# Patient Record
Sex: MALE | Race: WHITE | NOT HISPANIC OR LATINO | Employment: OTHER | URBAN - NONMETROPOLITAN AREA
[De-identification: names, ages, dates, MRNs, and addresses within clinical notes are randomized per-mention and may not be internally consistent; named-entity substitution may affect disease eponyms.]

---

## 2019-03-19 ENCOUNTER — TELEPHONE (OUTPATIENT)
Dept: SURGERY | Facility: CLINIC | Age: 54
End: 2019-03-19

## 2019-03-19 ENCOUNTER — OFFICE VISIT (OUTPATIENT)
Dept: FAMILY MEDICINE CLINIC | Facility: CLINIC | Age: 54
End: 2019-03-19

## 2019-03-19 VITALS
OXYGEN SATURATION: 96 % | SYSTOLIC BLOOD PRESSURE: 122 MMHG | DIASTOLIC BLOOD PRESSURE: 80 MMHG | RESPIRATION RATE: 14 BRPM | HEIGHT: 70 IN | HEART RATE: 68 BPM | BODY MASS INDEX: 24.2 KG/M2 | WEIGHT: 169 LBS

## 2019-03-19 DIAGNOSIS — Z12.11 SCREEN FOR COLON CANCER: ICD-10-CM

## 2019-03-19 DIAGNOSIS — D48.9 NEOPLASM, UNCERTAIN WHETHER BENIGN OR MALIGNANT: ICD-10-CM

## 2019-03-19 DIAGNOSIS — Z00.00 ROUTINE GENERAL MEDICAL EXAMINATION AT A HEALTH CARE FACILITY: Primary | ICD-10-CM

## 2019-03-19 DIAGNOSIS — D23.9 MULTIPLE DYSPLASTIC NEVI: ICD-10-CM

## 2019-03-19 DIAGNOSIS — Z12.5 SCREENING FOR PROSTATE CANCER: ICD-10-CM

## 2019-03-19 DIAGNOSIS — F41.1 GENERALIZED ANXIETY DISORDER: ICD-10-CM

## 2019-03-19 PROCEDURE — 11104 PUNCH BX SKIN SINGLE LESION: CPT | Performed by: FAMILY MEDICINE

## 2019-03-19 PROCEDURE — 11105 PUNCH BX SKIN EA SEP/ADDL: CPT | Performed by: FAMILY MEDICINE

## 2019-03-19 PROCEDURE — 99386 PREV VISIT NEW AGE 40-64: CPT | Performed by: FAMILY MEDICINE

## 2019-03-19 RX ORDER — LORATADINE 10 MG/1
10 TABLET ORAL DAILY
COMMUNITY
End: 2020-03-23 | Stop reason: SDUPTHER

## 2019-03-19 RX ORDER — ESCITALOPRAM OXALATE 10 MG/1
10 TABLET ORAL DAILY
Qty: 30 TABLET | Refills: 1 | Status: SHIPPED | OUTPATIENT
Start: 2019-03-19 | End: 2019-04-15 | Stop reason: SDUPTHER

## 2019-03-20 ENCOUNTER — RESULTS ENCOUNTER (OUTPATIENT)
Dept: FAMILY MEDICINE CLINIC | Facility: CLINIC | Age: 54
End: 2019-03-20

## 2019-03-20 DIAGNOSIS — Z00.00 ROUTINE GENERAL MEDICAL EXAMINATION AT A HEALTH CARE FACILITY: ICD-10-CM

## 2019-03-20 DIAGNOSIS — Z12.5 SCREENING FOR PROSTATE CANCER: ICD-10-CM

## 2019-03-22 ENCOUNTER — PREP FOR SURGERY (OUTPATIENT)
Dept: OTHER | Facility: HOSPITAL | Age: 54
End: 2019-03-22

## 2019-03-22 DIAGNOSIS — Z12.11 ENCOUNTER FOR SCREENING COLONOSCOPY: Primary | ICD-10-CM

## 2019-03-22 RX ORDER — POLYETHYLENE GLYCOL 3350 17 G/17G
238 POWDER, FOR SOLUTION ORAL ONCE
Qty: 14 PACKET | Refills: 0 | Status: SHIPPED | OUTPATIENT
Start: 2019-03-22 | End: 2019-03-22

## 2019-03-22 RX ORDER — BISACODYL 5 MG/1
5 TABLET, DELAYED RELEASE ORAL DAILY
Qty: 4 TABLET | Refills: 0 | Status: SHIPPED | OUTPATIENT
Start: 2019-03-22 | End: 2019-04-30

## 2019-03-22 NOTE — TELEPHONE ENCOUNTER
[place] at [facility] [place], instructions mailed, prep sent in.  Never had one.

## 2019-03-23 LAB
ALBUMIN SERPL-MCNC: 4.3 G/DL (ref 3.5–5)
ALBUMIN/GLOB SERPL: 1.5 G/DL (ref 1–2)
ALP SERPL-CCNC: 59 U/L (ref 38–126)
ALT SERPL-CCNC: 29 U/L (ref 13–69)
AST SERPL-CCNC: 21 U/L (ref 15–46)
BASOPHILS # BLD AUTO: 0.02 10*3/MM3 (ref 0–0.2)
BASOPHILS NFR BLD AUTO: 0.7 % (ref 0–2.5)
BILIRUB SERPL-MCNC: 0.9 MG/DL (ref 0.2–1.3)
BUN SERPL-MCNC: 16 MG/DL (ref 7–20)
BUN/CREAT SERPL: 14.5 (ref 6.3–21.9)
CALCIUM SERPL-MCNC: 9.9 MG/DL (ref 8.4–10.2)
CHLORIDE SERPL-SCNC: 105 MMOL/L (ref 98–107)
CHOLEST SERPL-MCNC: 175 MG/DL (ref 100–199)
CO2 SERPL-SCNC: 28 MMOL/L (ref 26–30)
CREAT SERPL-MCNC: 1.1 MG/DL (ref 0.6–1.3)
EOSINOPHIL # BLD AUTO: 0.05 10*3/MM3 (ref 0–0.7)
EOSINOPHIL NFR BLD AUTO: 1.7 % (ref 0–7)
ERYTHROCYTE [DISTWIDTH] IN BLOOD BY AUTOMATED COUNT: 12.6 % (ref 11.5–14.5)
GLOBULIN SER CALC-MCNC: 2.8 GM/DL
GLUCOSE SERPL-MCNC: 102 MG/DL (ref 74–98)
HCT VFR BLD AUTO: 48.8 % (ref 42–52)
HDL SERPL-SCNC: 34.2 UMOL/L
HDLC SERPL-MCNC: 52 MG/DL
HGB BLD-MCNC: 16.4 G/DL (ref 14–18)
IMM GRANULOCYTES # BLD AUTO: 0.01 10*3/MM3 (ref 0–0.06)
IMM GRANULOCYTES NFR BLD AUTO: 0.3 % (ref 0–0.6)
LDL SERPL QN: 20.9 NM
LDL SERPL-SCNC: 1219 NMOL/L
LDL SMALL SERPL-SCNC: 515 NMOL/L
LDLC SERPL CALC-MCNC: 106 MG/DL (ref 0–99)
LYMPHOCYTES # BLD AUTO: 1.09 10*3/MM3 (ref 0.6–3.4)
LYMPHOCYTES NFR BLD AUTO: 36 % (ref 10–50)
MCH RBC QN AUTO: 29.8 PG (ref 27–31)
MCHC RBC AUTO-ENTMCNC: 33.6 G/DL (ref 30–37)
MCV RBC AUTO: 88.6 FL (ref 80–94)
MONOCYTES # BLD AUTO: 0.29 10*3/MM3 (ref 0–0.9)
MONOCYTES NFR BLD AUTO: 9.6 % (ref 0–12)
NEUTROPHILS # BLD AUTO: 1.57 10*3/MM3 (ref 2–6.9)
NEUTROPHILS NFR BLD AUTO: 51.7 % (ref 37–80)
NRBC BLD AUTO-RTO: 0 /100 WBC (ref 0–0)
PLATELET # BLD AUTO: 185 10*3/MM3 (ref 130–400)
POTASSIUM SERPL-SCNC: 4.7 MMOL/L (ref 3.5–5.1)
PROT SERPL-MCNC: 7.1 G/DL (ref 6.3–8.2)
PSA SERPL-MCNC: 1.01 NG/ML (ref 0.06–4)
RBC # BLD AUTO: 5.51 10*6/MM3 (ref 4.7–6.1)
SODIUM SERPL-SCNC: 139 MMOL/L (ref 137–145)
TRIGL SERPL-MCNC: 87 MG/DL (ref 0–149)
WBC # BLD AUTO: 3.03 10*3/MM3 (ref 4.8–10.8)

## 2019-04-03 ENCOUNTER — OUTSIDE FACILITY SERVICE (OUTPATIENT)
Dept: SURGERY | Facility: CLINIC | Age: 54
End: 2019-04-03

## 2019-04-03 PROCEDURE — 45385 COLONOSCOPY W/LESION REMOVAL: CPT | Performed by: SURGERY

## 2019-04-15 DIAGNOSIS — F41.1 GENERALIZED ANXIETY DISORDER: ICD-10-CM

## 2019-04-15 RX ORDER — ESCITALOPRAM OXALATE 10 MG/1
10 TABLET ORAL DAILY
Qty: 30 TABLET | Refills: 1 | Status: SHIPPED | OUTPATIENT
Start: 2019-04-15 | End: 2019-04-19 | Stop reason: SDUPTHER

## 2019-04-19 RX ORDER — ESCITALOPRAM OXALATE 10 MG/1
10 TABLET ORAL DAILY
Qty: 30 TABLET | Refills: 1 | Status: SHIPPED | OUTPATIENT
Start: 2019-04-19 | End: 2019-04-30 | Stop reason: SDUPTHER

## 2019-04-30 ENCOUNTER — OFFICE VISIT (OUTPATIENT)
Dept: FAMILY MEDICINE CLINIC | Facility: CLINIC | Age: 54
End: 2019-04-30

## 2019-04-30 VITALS
OXYGEN SATURATION: 96 % | DIASTOLIC BLOOD PRESSURE: 78 MMHG | RESPIRATION RATE: 14 BRPM | WEIGHT: 170 LBS | SYSTOLIC BLOOD PRESSURE: 122 MMHG | BODY MASS INDEX: 24.34 KG/M2 | HEART RATE: 76 BPM | HEIGHT: 70 IN

## 2019-04-30 DIAGNOSIS — K63.5 POLYP OF COLON, UNSPECIFIED PART OF COLON, UNSPECIFIED TYPE: Primary | ICD-10-CM

## 2019-04-30 DIAGNOSIS — F41.1 GENERALIZED ANXIETY DISORDER: ICD-10-CM

## 2019-04-30 DIAGNOSIS — K57.30 DIVERTICULOSIS OF SIGMOID COLON: ICD-10-CM

## 2019-04-30 PROCEDURE — 99213 OFFICE O/P EST LOW 20 MIN: CPT | Performed by: FAMILY MEDICINE

## 2019-04-30 RX ORDER — ESCITALOPRAM OXALATE 10 MG/1
10 TABLET ORAL DAILY
Qty: 90 TABLET | Refills: 3 | Status: SHIPPED | OUTPATIENT
Start: 2019-04-30 | End: 2020-03-23 | Stop reason: SDUPTHER

## 2019-10-29 ENCOUNTER — OFFICE VISIT (OUTPATIENT)
Dept: FAMILY MEDICINE CLINIC | Facility: CLINIC | Age: 54
End: 2019-10-29

## 2019-10-29 VITALS
DIASTOLIC BLOOD PRESSURE: 75 MMHG | HEIGHT: 70 IN | OXYGEN SATURATION: 97 % | TEMPERATURE: 97.7 F | HEART RATE: 69 BPM | BODY MASS INDEX: 25.21 KG/M2 | SYSTOLIC BLOOD PRESSURE: 125 MMHG | WEIGHT: 176.13 LBS

## 2019-10-29 DIAGNOSIS — F41.1 GENERALIZED ANXIETY DISORDER: Primary | ICD-10-CM

## 2019-10-29 DIAGNOSIS — M70.50 PES ANSERINE BURSITIS: ICD-10-CM

## 2019-10-29 PROCEDURE — 99213 OFFICE O/P EST LOW 20 MIN: CPT | Performed by: FAMILY MEDICINE

## 2019-10-29 RX ORDER — SULINDAC 200 MG/1
TABLET ORAL
Qty: 42 TABLET | Refills: 0 | Status: SHIPPED | OUTPATIENT
Start: 2019-10-29 | End: 2020-02-28

## 2019-10-29 NOTE — PROGRESS NOTES
Established Patient        Chief Complaint:   Chief Complaint   Patient presents with   • Follow-up     6 month follow up   • Knee Pain     Complaints of left knee pain. Patient states that this has been an on going issue, but the pain seems to be getting worse.        Carl Moraes is a 54 y.o. male    History of Present Illness:   Here for scheduled six-month follow-up concerning his generalized anxiety disorder.  Patient continues to utilize Lexapro daily, has noticed profound improvement mood stability and overall generalized well-being.  Denies any suicidal or homicidal ideation.  He does continue to utilize daily aspirin therapy additionally.    He complains today of left medial knee discomfort, worsened since most recent home remodeling project of which she has been on his knees and up and down from a squatting position in numerous times.  He denies any blunt no single episodes of trauma.  He denies any fever or chills.  No overlying skin changes.  Pain is worsened with palpation of specified areas to the medial portion of the left knee.  He denies any locking or catching of the joint, able to bear weight without difficulty, but pain is worsened with certain activities and movement of the lower extremity.    Subjective     The following portions of the patient's history were reviewed and updated as appropriate: allergies, current medications, past family history, past medical history, past social history, past surgical history and problem list.    No Known Allergies    Review of Systems  1. Constitutional: Negative for fever. Negative for chills, diaphoresis, fatigue and unexpected weight change.   2. HENT: No dysphagia; no changes to vision/hearing/smell/taste; no epistaxis  3. Eyes: Negative for redness and visual disturbance.   4. Respiratory: negative for shortness of breath. Negative for chest pain . Negative for cough and chest tightness.   5. Cardiovascular: Negative for chest pain and palpitations.  "  6. Gastrointestinal: Negative for abdominal distention, abdominal pain and blood in stool.   7. Endocrine: Negative for cold intolerance and heat intolerance.   8. Genitourinary: Negative for difficulty urinating, dysuria and frequency.   9. Musculoskeletal: As per above.  10. Skin: Negative for color change, rash and wound.   11. Neurological: Negative for syncope, weakness and headaches.   12. Hematological: Negative for adenopathy. Does not bruise/bleed easily.   13. Psychiatric/Behavioral: Negative for confusion. The patient is not nervous/anxious.    Objective     Physical Exam   Vital Signs: /75   Pulse 69   Temp 97.7 °F (36.5 °C)   Ht 177.8 cm (70\")   Wt 79.9 kg (176 lb 2 oz)   SpO2 97%   BMI 25.27 kg/m²     General Appearance: alert, oriented x 3, no acute distress.  Skin: warm and dry.   HEENT: Atraumatic.  pupils round and reactive to light and accommodation, oral mucosa pink and moist.  Nares patent without epistaxis.  External auditory canals are patent tympanic membranes intact.  Neck: supple, no JVD, trachea midline.  No thyromegaly  Lungs: CTA, unlabored breathing effort.  Heart: RRR, normal S1 and S2, no S3, no rub.  Abdomen: soft, non-tender, no palpable bladder, present bowel sounds to auscultation ×4.  No guarding or rigidity.  Extremities: no clubbing, cyanosis or edema.  Good range of motion actively and passively.  Symmetric muscle strength and development.  No leg length discrepancies.  Pain on palpation specifically around the peds anserine bursa of the left knee.  No ligamentous laxity, Peng's test was negative.  No appreciable joint line tenderness.  Emy/Woods test negative.  Symmetric dorsiflexion and plantar flexion of bilateral knees.  Quadriceps mechanism intact.  Lockman's test negative.  Neuro: normal speech and mental status.  Cranial nerves II through XII intact.  No anosmia. DTR 2+; proprioception intact.  No focal motor/sensory deficits.    Assessment and " Plan      Assessment:   Carl was seen today for follow-up and knee pain.    Diagnoses and all orders for this visit:    Generalized anxiety disorder    Pes anserine bursitis  -     sulindac (CLINORIL) 200 MG tablet; 1 po bid with food x 14 days, then 1 po daily with food x 14 days, then STOP.        Plan:  I have recommended formal ice compress therapy to left knee, affected area.  Of also recommended a 2-week twice daily course of sulindac 200 mg, after that can take an additional 2 weeks for once daily dosing.  Should he not improve, or if he worsens, return to the clinic for probable need of injection.    Continue current mood stabilizing medication.    Plan to follow-up in 4 months or so for yearly annual visit.  Continue daily multivitamin therapy.  Discussion Summary:    Discussed plan of care in detail with pt today; pt verb understanding and agrees.  Follow up:  Return in about 4 months (around 2/29/2020) for Recheck.     Patient Instructions   Pes Anserine Bursitis Rehab  Ask your health care provider which exercises are safe for you. Do exercises exactly as told by your health care provider and adjust them as directed. It is normal to feel mild stretching, pulling, tightness, or discomfort as you do these exercises, but you should stop right away if you feel sudden pain or your pain gets worse. Do not begin these exercises until told by your health care provider.  Stretching and range of motion exercises  These exercises warm up your muscles and joints and improve the movement and flexibility of your knee. These exercises also help to relieve pain and stiffness.  Exercise A: Hamstring, doorway    1. Lie on your back in front of a doorway with your left / right leg resting against the wall and your other leg flat on the floor in the doorway. There should be a slight bend in your left / right knee.  2. Straighten your left / right knee. You should feel a stretch behind your knee or thigh. If you do not,  scoot your buttocks closer to the door.  3. Hold this position for __________ seconds.  Repeat __________ times. Complete this stretch __________ times a day.  Exercise B: V-sit (hamstrings and adductors)    1. Sit on the floor with your legs extended in a large “V” shape. Keep your knees straight during this exercise.  2. Keeping your head and back in a straight line, bend at your waist to reach for your left foot (position A). You should feel a stretch in your right inner thigh.  3. Hold for __________ seconds. Then slowly return to the upright position.  4. Keeping your head and back in a straight line, bend at your waist to reach forward (position B). You should feel a stretch behind both of your thighs or knees.  5. Hold for __________ seconds. Then slowly return to the upright position.  6. Keeping your head and back in a straight line, bend at your waist to reach for your right foot (position C). You should feel a stretch in your left inner thigh.  7. Hold for __________ seconds. Then slowly return to the upright position.  Repeat __________ times. Complete this exercise __________ times a day.  Exercise C: Quadriceps, prone    14. Lie on your abdomen on a firm surface, such as a bed or padded floor.  15. Bend your left / right knee and hold your ankle. If you cannot reach your ankle or pant leg, loop a belt around your foot and grab the belt instead.  16. Gently pull your heel toward your buttocks. Your knee should not slide out to the side. You should feel a stretch in the front of your thigh and knee.  17. Hold this position for __________ seconds.  Repeat __________ times. Complete this stretch __________ times a day.  Strengthening exercises  These exercises build strength and endurance in your knee and hip. Endurance is the ability to use your muscles for a long time, even after they get tired.  Exercise D: Terminal knee extension, quadriceps  1. Secure a long loop of rubber exercise band around a  sturdy object like a table leg.  2. Put the band behind your left / right knee. Step back from where the band is secured to put tension on the band.  3. Slowly bend your left / right knee. Keep your left / right foot flat on the floor.  4. Tighten the muscle in the front of your thigh and push back against the band to straighten your knee.  5. Hold this position for __________ seconds.  6. Return to having your left / right knee bent.  Repeat __________ times. Complete this stretch __________ times a day.  Exercise E: Straight leg raises (hip abductors)  1. Lie on your side with your left / right leg in the top position. Your head, shoulder, knee, and hip should line up. You may bend your bottom knee to help you balance.  2. Lift your top leg 4-6 inches (10-15 cm) while keeping your toes pointed straight ahead.  3. Hold this position for __________ seconds.  4. Slowly lower your leg to the starting position.  5. Allow your muscles to relax completely after each repetition.  Repeat __________ times. Complete this exercise __________ times a day.  This information is not intended to replace advice given to you by your health care provider. Make sure you discuss any questions you have with your health care provider.  Document Released: 12/18/2006 Document Revised: 08/22/2017 Document Reviewed: 12/02/2016  [employer]vier Interactive Patient Education © 2019 [employer]  Pes Anserine Bursitis    The pes anserine is an area on the inside of your knee, just below the joint, which is cushioned by a fluid-filled sac (bursa). Pes anserine bursitis is a condition that happens when this bursa gets swollen and irritated. The condition causes knee pain.  What are the causes?  This condition may be caused by:  · Making the same movement over and over.  · A hit to the inside of the leg.  What increases the risk?  This injury is most likely to develop in:  · Runners.  · Athletes who play sports that involve a lot of running and quick  side-to-side movements (cutting).  · Athletes who play contact sports.  · People who swim using an inward angle of the knee, such as with the breaststroke.  · People with tight hamstring muscles.  · Females.  · People who are overweight.  · People with flat feet.  · People who have diabetes or osteoarthritis.  What are the signs or symptoms?  Symptoms of this condition include:  · Knee pain that gets better with rest and worse with activities like climbing stairs, walking, running, or getting in and out of a chair (common).  · Swelling.  · Warmth.  · Tenderness when pressing at the inside of the lower leg, just below the knee.  How is this diagnosed?  This condition may be diagnosed based on:  · Your symptoms.  · Your medical history.  · A physical exam.  · Tests, such as:  ? X-rays.  ? MRI and ultrasound. These tests are done to check for swelling and fluid buildup in the bursa and to look at muscles and tendons.  During your physical exam, your health care provider will press on the tendon attachment to see if you feel pain. He or she may also check your hip and knee motion and strength.  How is this treated?  This condition may be treated by:  · Resting your knee.  · Avoiding activities that cause pain.  · Icing the inside of your knee.  · Raising (elevating) your knee while resting.  · Sleeping with a pillow between your knees. This will cushion your injured knee.  · Taking medicine to reduce pain and swelling.  · Having medicines injected into your knee.  · Doing strengthening and stretching exercises (physical therapy).  If these treatments do not work or if the condition keeps coming back, you may need to have surgery to remove the bursa.  Follow these instructions at home:  Managing pain, stiffness, and swelling    · If directed, apply ice to your knee.  ? Put ice in a plastic bag.  ? Place a towel between your skin and the bag.  ? Leave the ice on for 20 minutes, 2-3 times a day.  · While you are sitting,  elevate your knee.  · While you are lying down, elevate your knee above the level of your heart.  · Take over-the-counter and prescription medicines only as told by your health care provider.  Activity  · Return to your normal activities as told by your health care provider. Ask your health care provider what activities are safe for you.  · Do exercises as told by your health care provider.  General instructions  · Sleep with a pillow between your knees.  · Do not use any tobacco products, such as cigarettes, chewing tobacco, and e-cigarettes. Tobacco can delay healing. If you need help quitting, ask your health care provider.  · Keep all follow-up visits as told by your health care provider. This is important.  How is this prevented?  · Warm up and stretch before being active.  · Cool down and stretch after being active.  · Give your body time to rest between periods of activity.  · Make sure to use equipment that fits you.  · Be safe and responsible while being active to avoid falls.  · Do at least 150 minutes of moderate-intensity exercise each week, such as brisk walking or water aerobics.  · Maintain physical fitness, including:  ? Strength.  ? Flexibility.  ? Cardiovascular fitness.  ? Endurance.  Contact a health care provider if:  · Your symptoms do not improve.  · Your symptoms get worse.  This information is not intended to replace advice given to you by your health care provider. Make sure you discuss any questions you have with your health care provider.  Document Released: 12/18/2006 Document Revised: 02/08/2019 Document Reviewed: 12/02/2016  [employer] Interactive Patient Education © 2019 [employer]        Nicola Perry DO  10/29/19  2:36 PM    Please note that portions of this note may have been completed with a voice recognition program. Efforts were made to edit the dictations, but occasionally words are mistranscribed.

## 2019-10-29 NOTE — PATIENT INSTRUCTIONS
Pes Anserine Bursitis Rehab  Ask your health care provider which exercises are safe for you. Do exercises exactly as told by your health care provider and adjust them as directed. It is normal to feel mild stretching, pulling, tightness, or discomfort as you do these exercises, but you should stop right away if you feel sudden pain or your pain gets worse. Do not begin these exercises until told by your health care provider.  Stretching and range of motion exercises  These exercises warm up your muscles and joints and improve the movement and flexibility of your knee. These exercises also help to relieve pain and stiffness.  Exercise A: Hamstring, doorway    1. Lie on your back in front of a doorway with your left / right leg resting against the wall and your other leg flat on the floor in the doorway. There should be a slight bend in your left / right knee.  2. Straighten your left / right knee. You should feel a stretch behind your knee or thigh. If you do not, scoot your buttocks closer to the door.  3. Hold this position for __________ seconds.  Repeat __________ times. Complete this stretch __________ times a day.  Exercise B: V-sit (hamstrings and adductors)    1. Sit on the floor with your legs extended in a large “V” shape. Keep your knees straight during this exercise.  2. Keeping your head and back in a straight line, bend at your waist to reach for your left foot (position A). You should feel a stretch in your right inner thigh.  3. Hold for __________ seconds. Then slowly return to the upright position.  4. Keeping your head and back in a straight line, bend at your waist to reach forward (position B). You should feel a stretch behind both of your thighs or knees.  5. Hold for __________ seconds. Then slowly return to the upright position.  6. Keeping your head and back in a straight line, bend at your waist to reach for your right foot (position C). You should feel a stretch in your left inner  thigh.  7. Hold for __________ seconds. Then slowly return to the upright position.  Repeat __________ times. Complete this exercise __________ times a day.  Exercise C: Quadriceps, prone    1. Lie on your abdomen on a firm surface, such as a bed or padded floor.  2. Bend your left / right knee and hold your ankle. If you cannot reach your ankle or pant leg, loop a belt around your foot and grab the belt instead.  3. Gently pull your heel toward your buttocks. Your knee should not slide out to the side. You should feel a stretch in the front of your thigh and knee.  4. Hold this position for __________ seconds.  Repeat __________ times. Complete this stretch __________ times a day.  Strengthening exercises  These exercises build strength and endurance in your knee and hip. Endurance is the ability to use your muscles for a long time, even after they get tired.  Exercise D: Terminal knee extension, quadriceps  1. Secure a long loop of rubber exercise band around a sturdy object like a table leg.  2. Put the band behind your left / right knee. Step back from where the band is secured to put tension on the band.  3. Slowly bend your left / right knee. Keep your left / right foot flat on the floor.  4. Tighten the muscle in the front of your thigh and push back against the band to straighten your knee.  5. Hold this position for __________ seconds.  6. Return to having your left / right knee bent.  Repeat __________ times. Complete this stretch __________ times a day.  Exercise E: Straight leg raises (hip abductors)  1. Lie on your side with your left / right leg in the top position. Your head, shoulder, knee, and hip should line up. You may bend your bottom knee to help you balance.  2. Lift your top leg 4-6 inches (10-15 cm) while keeping your toes pointed straight ahead.  3. Hold this position for __________ seconds.  4. Slowly lower your leg to the starting position.  5. Allow your muscles to relax completely after  each repetition.  Repeat __________ times. Complete this exercise __________ times a day.  This information is not intended to replace advice given to you by your health care provider. Make sure you discuss any questions you have with your health care provider.  Document Released: 12/18/2006 Document Revised: 08/22/2017 Document Reviewed: 12/02/2016  [employer] Interactive Patient Education © 2019 [employer]  Pes Anserine Bursitis    The pes anserine is an area on the inside of your knee, just below the joint, which is cushioned by a fluid-filled sac (bursa). Pes anserine bursitis is a condition that happens when this bursa gets swollen and irritated. The condition causes knee pain.  What are the causes?  This condition may be caused by:  · Making the same movement over and over.  · A hit to the inside of the leg.  What increases the risk?  This injury is most likely to develop in:  · Runners.  · Athletes who play sports that involve a lot of running and quick side-to-side movements (cutting).  · Athletes who play contact sports.  · People who swim using an inward angle of the knee, such as with the breaststroke.  · People with tight hamstring muscles.  · Females.  · People who are overweight.  · People with flat feet.  · People who have diabetes or osteoarthritis.  What are the signs or symptoms?  Symptoms of this condition include:  · Knee pain that gets better with rest and worse with activities like climbing stairs, walking, running, or getting in and out of a chair (common).  · Swelling.  · Warmth.  · Tenderness when pressing at the inside of the lower leg, just below the knee.  How is this diagnosed?  This condition may be diagnosed based on:  · Your symptoms.  · Your medical history.  · A physical exam.  · Tests, such as:  ? X-rays.  ? MRI and ultrasound. These tests are done to check for swelling and fluid buildup in the bursa and to look at muscles and tendons.  During your physical exam, your health care  provider will press on the tendon attachment to see if you feel pain. He or she may also check your hip and knee motion and strength.  How is this treated?  This condition may be treated by:  · Resting your knee.  · Avoiding activities that cause pain.  · Icing the inside of your knee.  · Raising (elevating) your knee while resting.  · Sleeping with a pillow between your knees. This will cushion your injured knee.  · Taking medicine to reduce pain and swelling.  · Having medicines injected into your knee.  · Doing strengthening and stretching exercises (physical therapy).  If these treatments do not work or if the condition keeps coming back, you may need to have surgery to remove the bursa.  Follow these instructions at home:  Managing pain, stiffness, and swelling    · If directed, apply ice to your knee.  ? Put ice in a plastic bag.  ? Place a towel between your skin and the bag.  ? Leave the ice on for 20 minutes, 2-3 times a day.  · While you are sitting, elevate your knee.  · While you are lying down, elevate your knee above the level of your heart.  · Take over-the-counter and prescription medicines only as told by your health care provider.  Activity  · Return to your normal activities as told by your health care provider. Ask your health care provider what activities are safe for you.  · Do exercises as told by your health care provider.  General instructions  · Sleep with a pillow between your knees.  · Do not use any tobacco products, such as cigarettes, chewing tobacco, and e-cigarettes. Tobacco can delay healing. If you need help quitting, ask your health care provider.  · Keep all follow-up visits as told by your health care provider. This is important.  How is this prevented?  · Warm up and stretch before being active.  · Cool down and stretch after being active.  · Give your body time to rest between periods of activity.  · Make sure to use equipment that fits you.  · Be safe and responsible while  being active to avoid falls.  · Do at least 150 minutes of moderate-intensity exercise each week, such as brisk walking or water aerobics.  · Maintain physical fitness, including:  ? Strength.  ? Flexibility.  ? Cardiovascular fitness.  ? Endurance.  Contact a health care provider if:  · Your symptoms do not improve.  · Your symptoms get worse.  This information is not intended to replace advice given to you by your health care provider. Make sure you discuss any questions you have with your health care provider.  Document Released: 12/18/2006 Document Revised: 02/08/2019 Document Reviewed: 12/02/2016  [employer] Interactive Patient Education © 2019 [employer]

## 2020-02-28 ENCOUNTER — OFFICE VISIT (OUTPATIENT)
Dept: FAMILY MEDICINE CLINIC | Facility: CLINIC | Age: 55
End: 2020-02-28

## 2020-02-28 VITALS
BODY MASS INDEX: 25.05 KG/M2 | HEIGHT: 70 IN | SYSTOLIC BLOOD PRESSURE: 110 MMHG | DIASTOLIC BLOOD PRESSURE: 70 MMHG | HEART RATE: 88 BPM | WEIGHT: 175 LBS | OXYGEN SATURATION: 97 % | TEMPERATURE: 98.2 F

## 2020-02-28 DIAGNOSIS — M25.562 ARTHRALGIA OF BOTH KNEES: Primary | ICD-10-CM

## 2020-02-28 DIAGNOSIS — F41.1 GENERALIZED ANXIETY DISORDER: ICD-10-CM

## 2020-02-28 DIAGNOSIS — M25.561 ARTHRALGIA OF BOTH KNEES: Primary | ICD-10-CM

## 2020-02-28 PROCEDURE — 99213 OFFICE O/P EST LOW 20 MIN: CPT | Performed by: FAMILY MEDICINE

## 2020-02-28 RX ORDER — MELOXICAM 7.5 MG/1
7.5 TABLET ORAL DAILY
Qty: 90 TABLET | Refills: 3 | Status: SHIPPED | OUTPATIENT
Start: 2020-02-28 | End: 2020-03-23 | Stop reason: SDUPTHER

## 2020-03-17 DIAGNOSIS — Z11.59 ENCOUNTER FOR HEPATITIS C SCREENING TEST FOR LOW RISK PATIENT: ICD-10-CM

## 2020-03-17 DIAGNOSIS — E78.5 DYSLIPIDEMIA: Primary | ICD-10-CM

## 2020-03-17 DIAGNOSIS — Z00.00 ROUTINE GENERAL MEDICAL EXAMINATION AT A HEALTH CARE FACILITY: ICD-10-CM

## 2020-03-18 LAB
ALBUMIN SERPL-MCNC: 4.2 G/DL (ref 3.5–5.2)
ALBUMIN/GLOB SERPL: 1.7 G/DL
ALP SERPL-CCNC: 63 U/L (ref 39–117)
ALT SERPL-CCNC: 22 U/L (ref 1–41)
AST SERPL-CCNC: 15 U/L (ref 1–40)
BASOPHILS # BLD AUTO: 0.04 10*3/MM3 (ref 0–0.2)
BASOPHILS NFR BLD AUTO: 1.4 % (ref 0–1.5)
BILIRUB SERPL-MCNC: 0.5 MG/DL (ref 0.2–1.2)
BUN SERPL-MCNC: 14 MG/DL (ref 6–20)
BUN/CREAT SERPL: 12.8 (ref 7–25)
CALCIUM SERPL-MCNC: 9 MG/DL (ref 8.6–10.5)
CHLORIDE SERPL-SCNC: 105 MMOL/L (ref 98–107)
CHOLEST SERPL-MCNC: 190 MG/DL (ref 0–200)
CO2 SERPL-SCNC: 27.2 MMOL/L (ref 22–29)
CREAT SERPL-MCNC: 1.09 MG/DL (ref 0.76–1.27)
EOSINOPHIL # BLD AUTO: 0.07 10*3/MM3 (ref 0–0.4)
EOSINOPHIL NFR BLD AUTO: 2.4 % (ref 0.3–6.2)
ERYTHROCYTE [DISTWIDTH] IN BLOOD BY AUTOMATED COUNT: 13.1 % (ref 12.3–15.4)
GLOBULIN SER CALC-MCNC: 2.5 GM/DL
GLUCOSE SERPL-MCNC: 102 MG/DL (ref 65–99)
HCT VFR BLD AUTO: 45.7 % (ref 37.5–51)
HCV AB S/CO SERPL IA: <0.1 S/CO RATIO (ref 0–0.9)
HDLC SERPL-MCNC: 50 MG/DL (ref 40–60)
HGB BLD-MCNC: 15.8 G/DL (ref 13–17.7)
IMM GRANULOCYTES # BLD AUTO: 0.02 10*3/MM3 (ref 0–0.05)
IMM GRANULOCYTES NFR BLD AUTO: 0.7 % (ref 0–0.5)
LDLC SERPL CALC-MCNC: 119 MG/DL (ref 0–100)
LYMPHOCYTES # BLD AUTO: 1.13 10*3/MM3 (ref 0.7–3.1)
LYMPHOCYTES NFR BLD AUTO: 39.2 % (ref 19.6–45.3)
MCH RBC QN AUTO: 30 PG (ref 26.6–33)
MCHC RBC AUTO-ENTMCNC: 34.6 G/DL (ref 31.5–35.7)
MCV RBC AUTO: 86.9 FL (ref 79–97)
MONOCYTES # BLD AUTO: 0.22 10*3/MM3 (ref 0.1–0.9)
MONOCYTES NFR BLD AUTO: 7.6 % (ref 5–12)
NEUTROPHILS # BLD AUTO: 1.4 10*3/MM3 (ref 1.7–7)
NEUTROPHILS NFR BLD AUTO: 48.7 % (ref 42.7–76)
NRBC BLD AUTO-RTO: 0 /100 WBC (ref 0–0.2)
PLATELET # BLD AUTO: 177 10*3/MM3 (ref 140–450)
POTASSIUM SERPL-SCNC: 4.8 MMOL/L (ref 3.5–5.2)
PROT SERPL-MCNC: 6.7 G/DL (ref 6–8.5)
RBC # BLD AUTO: 5.26 10*6/MM3 (ref 4.14–5.8)
SODIUM SERPL-SCNC: 141 MMOL/L (ref 136–145)
TRIGL SERPL-MCNC: 103 MG/DL (ref 0–150)
VLDLC SERPL CALC-MCNC: 20.6 MG/DL
WBC # BLD AUTO: 2.88 10*3/MM3 (ref 3.4–10.8)

## 2020-03-23 ENCOUNTER — OFFICE VISIT (OUTPATIENT)
Dept: FAMILY MEDICINE CLINIC | Facility: CLINIC | Age: 55
End: 2020-03-23

## 2020-03-23 VITALS
BODY MASS INDEX: 25.34 KG/M2 | HEART RATE: 60 BPM | WEIGHT: 177 LBS | HEIGHT: 70 IN | OXYGEN SATURATION: 99 % | TEMPERATURE: 97.7 F | SYSTOLIC BLOOD PRESSURE: 120 MMHG | DIASTOLIC BLOOD PRESSURE: 80 MMHG

## 2020-03-23 DIAGNOSIS — F41.1 GENERALIZED ANXIETY DISORDER: ICD-10-CM

## 2020-03-23 DIAGNOSIS — M25.561 ARTHRALGIA OF BOTH KNEES: ICD-10-CM

## 2020-03-23 DIAGNOSIS — M25.562 ARTHRALGIA OF BOTH KNEES: ICD-10-CM

## 2020-03-23 DIAGNOSIS — Z00.00 ROUTINE GENERAL MEDICAL EXAMINATION AT A HEALTH CARE FACILITY: Primary | ICD-10-CM

## 2020-03-23 PROCEDURE — 99396 PREV VISIT EST AGE 40-64: CPT | Performed by: FAMILY MEDICINE

## 2020-03-23 RX ORDER — LORATADINE 10 MG/1
10 TABLET ORAL DAILY
Qty: 90 TABLET | Refills: 3 | Status: SHIPPED | OUTPATIENT
Start: 2020-03-23 | End: 2020-03-23 | Stop reason: SDUPTHER

## 2020-03-23 RX ORDER — MELOXICAM 7.5 MG/1
7.5 TABLET ORAL DAILY
Qty: 90 TABLET | Refills: 3 | Status: SHIPPED | OUTPATIENT
Start: 2020-03-23 | End: 2020-03-23 | Stop reason: SDUPTHER

## 2020-03-23 RX ORDER — LORATADINE 10 MG/1
10 TABLET ORAL DAILY
Qty: 90 TABLET | Refills: 3 | Status: SHIPPED | OUTPATIENT
Start: 2020-03-23 | End: 2021-02-19 | Stop reason: SDUPTHER

## 2020-03-23 RX ORDER — ESCITALOPRAM OXALATE 10 MG/1
10 TABLET ORAL DAILY
Qty: 90 TABLET | Refills: 3 | Status: SHIPPED | OUTPATIENT
Start: 2020-03-23 | End: 2020-03-23 | Stop reason: SDUPTHER

## 2020-03-23 RX ORDER — MELOXICAM 7.5 MG/1
7.5 TABLET ORAL DAILY
Qty: 90 TABLET | Refills: 3 | Status: SHIPPED | OUTPATIENT
Start: 2020-03-23 | End: 2021-02-19 | Stop reason: SDUPTHER

## 2020-03-23 RX ORDER — ESCITALOPRAM OXALATE 10 MG/1
10 TABLET ORAL DAILY
Qty: 90 TABLET | Refills: 3 | Status: SHIPPED | OUTPATIENT
Start: 2020-03-23 | End: 2021-02-19 | Stop reason: SDUPTHER

## 2020-03-23 NOTE — PROGRESS NOTES
Established Patient        Chief Complaint:   Chief Complaint   Patient presents with   • Annual Exam     here for physical        Carl Dimitry is a 55 y.o. male    History of Present Illness:   Here for scheduled annual preventative healthcare exam.  He denies any new problems today.  Currently tolerating all of his medications without difficulty, does need refills however.    He denies any chest pain or shortness of breath.  No reports of dyspnea on exertion, palpitations, vertigo or syncope.  Denies aspiration or dysphagia.  Tolerating a very balanced diet.  No reports of orthopnea, hemoptysis or cough.  Denies dysuria or hematuria.  No reports of bright red blood or black or tarry stools.    Continues to have significant improvement to his arthralgias of osteoarthritic nature, tolerating meloxicam daily.    Subjective     The following portions of the patient's history were reviewed and updated as appropriate: allergies, current medications, past family history, past medical history, past social history, past surgical history and problem list.    No Known Allergies    Review of Systems  1. Constitutional: Negative for fever. Negative for chills, diaphoresis, fatigue and unexpected weight change.   2. HENT: No dysphagia; no changes to vision/hearing/smell/taste; no epistaxis  3. Eyes: Negative for redness and visual disturbance.   4. Respiratory: negative for shortness of breath. Negative for chest pain . Negative for cough and chest tightness.   5. Cardiovascular: Negative for chest pain and palpitations.   6. Gastrointestinal: Negative for abdominal distention, abdominal pain and blood in stool.   7. Endocrine: Negative for cold intolerance and heat intolerance.   8. Genitourinary: Negative for difficulty urinating, dysuria and frequency.   9. Musculoskeletal: Negative for arthralgias, back pain and myalgias.   10. Skin: Negative for color change, rash and wound.   11. Neurological: Negative for syncope,  "weakness and headaches.   12. Hematological: Negative for adenopathy. Does not bruise/bleed easily.   13. Psychiatric/Behavioral: Negative for confusion. The patient is not nervous/anxious.    Objective     Physical Exam   Vital Signs: /80   Pulse 60   Temp 97.7 °F (36.5 °C)   Ht 177.8 cm (70\")   Wt 80.3 kg (177 lb)   SpO2 99%   BMI 25.40 kg/m²     General Appearance: alert, oriented x 3, no acute distress.  Skin: warm and dry.   HEENT: Atraumatic.  pupils round and reactive to light and accommodation, oral mucosa pink and moist.  Nares patent without epistaxis.  External auditory canals are patent tympanic membranes intact.  Neck: supple, no JVD, trachea midline.  No thyromegaly  Lungs: CTA, unlabored breathing effort.  Heart: RRR, normal S1 and S2, no S3, no rub.  Abdomen: soft, non-tender, no palpable bladder, present bowel sounds to auscultation ×4.  No guarding or rigidity.  Extremities: no clubbing, cyanosis or edema.  Good range of motion actively and passively.  Symmetric muscle strength and development  Neuro: normal speech and mental status.  Cranial nerves II through XII intact.  No anosmia. DTR 2+; proprioception intact.  No focal motor/sensory deficits.    Assessment and Plan      Assessment:   Carl was seen today for annual exam.    Diagnoses and all orders for this visit:    Routine general medical examination at a health care facility    Generalized anxiety disorder  -     Discontinue: escitalopram (Lexapro) 10 MG tablet; Take 1 tablet by mouth Daily.    Arthralgia of both knees  -     Discontinue: meloxicam (Mobic) 7.5 MG tablet; Take 1 tablet by mouth Daily.    Other orders  -     Discontinue: loratadine (CLARITIN) 10 MG tablet; Take 1 tablet by mouth Daily.        Plan:  Will RTC in approx 3 months, to have excision of nevus to right N/S fold and chin.    Discussed most recent labs with pt today.    Refills for needed meds today.    Continue daily NSAID therapy.    Continue current " mood stabilizer, no dose adjustment needed at this time.    Discussion Summary:  I have discussed age/gender specific preventative healthcare issues in detail with patient today.  I have answered all of the questions.    Discussed plan of care in detail with pt today; pt verb understanding and agrees.  Follow up:  Return in 3 months (on 6/23/2020).     Patient Instructions   Preventive Care 40-64 Years, Male  Preventive care refers to lifestyle choices and visits with your health care provider that can promote health and wellness.  What does preventive care include?    · A yearly physical exam. This is also called an annual well check.  · Dental exams once or twice a year.  · Routine eye exams. Ask your health care provider how often you should have your eyes checked.  · Personal lifestyle choices, including:  ? Daily care of your teeth and gums.  ? Regular physical activity.  ? Eating a healthy diet.  ? Avoiding tobacco and drug use.  ? Limiting alcohol use.  ? Practicing safe sex.  ? Taking low-dose aspirin every day starting at age 50.  What happens during an annual well check?  The services and screenings done by your health care provider during your annual well check will depend on your age, overall health, lifestyle risk factors, and family history of disease.  Counseling  Your health care provider may ask you questions about your:  · Alcohol use.  · Tobacco use.  · Drug use.  · Emotional well-being.  · Home and relationship well-being.  · Sexual activity.  · Eating habits.  · Work and work environment.  Screening  You may have the following tests or measurements:  · Height, weight, and BMI.  · Blood pressure.  · Lipid and cholesterol levels. These may be checked every 5 years, or more frequently if you are over 50 years old.  · Skin check.  · Lung cancer screening. You may have this screening every year starting at age 55 if you have a 30-pack-year history of smoking and currently smoke or have quit within  the past 15 years.  · Colorectal cancer screening. All adults should have this screening starting at age 50 and continuing until age 75. Your health care provider may recommend screening at age 45. You will have tests every 1-10 years, depending on your results and the type of screening test. People at increased risk should start screening at an earlier age. Screening tests may include:  ? Guaiac-based fecal occult blood testing.  ? Fecal immunochemical test (FIT).  ? Stool DNA test.  ? Virtual colonoscopy.  ? Sigmoidoscopy. During this test, a flexible tube with a tiny camera (sigmoidoscope) is used to examine your rectum and lower colon. The sigmoidoscope is inserted through your anus into your rectum and lower colon.  ? Colonoscopy. During this test, a long, thin, flexible tube with a tiny camera (colonoscope) is used to examine your entire colon and rectum.  · Prostate cancer screening. Recommendations will vary depending on your family history and other risks.  · Hepatitis C blood test.  · Hepatitis B blood test.  · Sexually transmitted disease (STD) testing.  · Diabetes screening. This is done by checking your blood sugar (glucose) after you have not eaten for a while (fasting). You may have this done every 1-3 years.  Discuss your test results, treatment options, and if necessary, the need for more tests with your health care provider.  Vaccines  Your health care provider may recommend certain vaccines, such as:  · Influenza vaccine. This is recommended every year.  · Tetanus, diphtheria, and acellular pertussis (Tdap, Td) vaccine. You may need a Td booster every 10 years.  · Varicella vaccine. You may need this if you have not been vaccinated.  · Zoster vaccine. You may need this after age 60.  · Measles, mumps, and rubella (MMR) vaccine. You may need at least one dose of MMR if you were born in 1957 or later. You may also need a second dose.  · Pneumococcal 13-valent conjugate (PCV13) vaccine. You may need  this if you have certain conditions and have not been vaccinated.  · Pneumococcal polysaccharide (PPSV23) vaccine. You may need one or two doses if you smoke cigarettes or if you have certain conditions.  · Meningococcal vaccine. You may need this if you have certain conditions.  · Hepatitis A vaccine. You may need this if you have certain conditions or if you travel or work in places where you may be exposed to hepatitis A.  · Hepatitis B vaccine. You may need this if you have certain conditions or if you travel or work in places where you may be exposed to hepatitis B.  · Haemophilus influenzae type b (Hib) vaccine. You may need this if you have certain risk factors.  Talk to your health care provider about which screenings and vaccines you need and how often you need them.  This information is not intended to replace advice given to you by your health care provider. Make sure you discuss any questions you have with your health care provider.  Document Released: 01/13/2017 Document Revised: 02/07/2019 Document Reviewed: 10/18/2016  [employer] Patient Education © 2019 [employer]        Nicola Perry DO  03/23/20  13:21          Please note that portions of this note may have been completed with a voice recognition program. Efforts were made to edit the dictations, but occasionally words are mistranscribed.

## 2020-03-23 NOTE — TELEPHONE ENCOUNTER
meloxicam (Mobic) 7.5 MG tablet   loratadine (CLARITIN) 10 MG tablet     escitalopram (Lexapro) 10 MG tablet  aspirin 81 MG tablet      [facility]  [phone]

## 2020-06-23 ENCOUNTER — OFFICE VISIT (OUTPATIENT)
Dept: FAMILY MEDICINE CLINIC | Facility: CLINIC | Age: 55
End: 2020-06-23

## 2020-06-23 VITALS
HEIGHT: 70 IN | SYSTOLIC BLOOD PRESSURE: 130 MMHG | TEMPERATURE: 97.7 F | BODY MASS INDEX: 25.48 KG/M2 | DIASTOLIC BLOOD PRESSURE: 88 MMHG | WEIGHT: 178 LBS

## 2020-06-23 DIAGNOSIS — L91.8 INFLAMED SKIN TAG: Primary | ICD-10-CM

## 2020-06-23 PROCEDURE — 99213 OFFICE O/P EST LOW 20 MIN: CPT | Performed by: FAMILY MEDICINE

## 2020-07-15 NOTE — PROGRESS NOTES
Established Patient        Chief Complaint:   Chief Complaint   Patient presents with   • Skin Lesion        Carl Moraes is a 55 y.o. male    History of Present Illness:   Presents today with complaints of inflamed skin tag/nevus to the right nasolabial fold, present for numerous years.  It affects his ability to groom daily, often times friction as a result of shaving.  It often times bleeding is quite painful.  He is interested in definitive excisional treatment.  Denies any problems with scarring or any fever, chills or night sweats at this time.    Subjective     The following portions of the patient's history were reviewed and updated as appropriate: allergies, current medications, past family history, past medical history, past social history, past surgical history and problem list.    No Known Allergies    Review of Systems  1. Constitutional: Negative for fever. Negative for chills, diaphoresis, fatigue and unexpected weight change.   2. HENT: No dysphagia; no changes to vision/hearing/smell/taste; no epistaxis  3. Eyes: Negative for redness and visual disturbance.   4. Respiratory: negative for shortness of breath. Negative for chest pain . Negative for cough and chest tightness.   5. Cardiovascular: Negative for chest pain and palpitations.   6. Gastrointestinal: Negative for abdominal distention, abdominal pain and blood in stool.   7. Endocrine: Negative for cold intolerance and heat intolerance.   8. Genitourinary: Negative for difficulty urinating, dysuria and frequency.   9. Musculoskeletal: Negative for arthralgias, back pain and myalgias.   10. Skin: Nevus/skin tag to the right nasolabial fold.  11. Neurological: Negative for syncope, weakness and headaches.   12. Hematological: Negative for adenopathy. Does not bruise/bleed easily.   13. Psychiatric/Behavioral: Negative for confusion. The patient is not nervous/anxious.    Objective     Physical Exam   Vital Signs: /88   Temp 97.7 °F  "(36.5 °C)   Ht 177.8 cm (70\")   Wt 80.7 kg (178 lb)   BMI 25.54 kg/m²     General Appearance: alert, oriented x 3, no acute distress.  Skin: warm and dry.  Noted nonpigmented nevus versus complex skin tag to the right nasolabial fold, approximately 5 mm in diameter, surrounded by local erythema from friction.  HEENT: Atraumatic.  pupils round and reactive to light and accommodation, oral mucosa pink and moist.  Nares patent without epistaxis.  External auditory canals are patent tympanic membranes intact.  Neck: supple, no JVD, trachea midline.  No thyromegaly  Lungs: CTA, unlabored breathing effort.  Heart: RRR, normal S1 and S2, no S3, no rub.  Abdomen: soft, non-tender, no palpable bladder, present bowel sounds to auscultation ×4.  No guarding or rigidity.  Extremities: no clubbing, cyanosis or edema.  Good range of motion actively and passively.  Symmetric muscle strength and development  Neuro: normal speech and mental status.  Cranial nerves II through XII intact.  No anosmia. DTR 2+; proprioception intact.  No focal motor/sensory deficits.    Form consent obtained, risks versus benefits discussed in detail.  Patient verbalized understanding and signed consent.  Area was infiltrated with 1% lidocaine without epinephrine, approximately 0.3 mL.  Forceps and #10 scalpel blade were used for tangential excision.  Hemostasis achieved with direct pressure followed by silver nitrate.  Bandage applied to affected area.  Wound care discussed in detail with patient.    Assessment and Plan      Assessment:   Carl was seen today for skin lesion.    Diagnoses and all orders for this visit:    Inflamed skin tag        Plan:  Patient tolerated excision, tangential in nature, to inflamed/irritated nevus of right nasolabial fold.  Silver nitrate was used for cauterization and hemostasis.  Wound care discussed in detail with patient.  She develop any ill effects to the treated area history of [facility] " immediately.  Discussion Summary:    Discussed plan of care in detail with pt today; pt verb understanding and agrees.  Follow up:  No follow-ups on file.     There are no Patient Instructions on file for this visit.    Nicola Perry DO  06/23/20  17:30          Please note that portions of this note may have been completed with a voice recognition program. Efforts were made to edit the dictations, but occasionally words are mistranscribed.

## 2020-11-02 ENCOUNTER — HOSPITAL ENCOUNTER (OUTPATIENT)
Facility: HOSPITAL | Age: 55
Discharge: HOME | End: 2020-11-03
Attending: FAMILY MEDICINE | Admitting: UROLOGY

## 2020-11-02 ENCOUNTER — HOSPITAL ENCOUNTER (OUTPATIENT)
Dept: CT IMAGING | Facility: HOSPITAL | Age: 55
Discharge: HOME | End: 2020-11-02
Admitting: FAMILY MEDICINE

## 2020-11-02 DIAGNOSIS — N13.2 URETERAL STONE WITH HYDRONEPHROSIS: Primary | ICD-10-CM

## 2020-11-02 PROBLEM — N13.5 OBSTRUCTION OF RIGHT URETER: Status: ACTIVE | Noted: 2020-11-02

## 2020-11-02 PROBLEM — N13.30 HYDRONEPHROSIS, RIGHT: Status: ACTIVE | Noted: 2020-11-02

## 2020-11-02 PROBLEM — R10.9 ACUTE ABDOMINAL PAIN IN RIGHT FLANK: Status: ACTIVE | Noted: 2020-11-02

## 2020-11-02 LAB
ALBUMIN SERPL-MCNC: 4 G/DL (ref 3.5–5.2)
ALBUMIN/GLOB SERPL: 1.6 G/DL
ALP SERPL-CCNC: 67 U/L (ref 39–117)
ALT SERPL W P-5'-P-CCNC: 21 U/L (ref 1–41)
ANION GAP SERPL CALCULATED.3IONS-SCNC: 10 MMOL/L (ref 5–15)
AST SERPL-CCNC: 17 U/L (ref 1–40)
BACTERIA UR QL AUTO: ABNORMAL /HPF
BASOPHILS # BLD AUTO: 0.03 10*3/MM3 (ref 0–0.2)
BASOPHILS NFR BLD AUTO: 0.5 % (ref 0–1.5)
BILIRUB SERPL-MCNC: 0.7 MG/DL (ref 0–1.2)
BILIRUB UR QL STRIP: NEGATIVE
BUN SERPL-MCNC: 17 MG/DL (ref 6–20)
BUN/CREAT SERPL: 11 (ref 7–25)
CALCIUM SPEC-SCNC: 9.1 MG/DL (ref 8.6–10.5)
CHLORIDE SERPL-SCNC: 103 MMOL/L (ref 98–107)
CLARITY UR: CLEAR
CO2 SERPL-SCNC: 26 MMOL/L (ref 22–29)
COLOR UR: YELLOW
CREAT SERPL-MCNC: 1.55 MG/DL (ref 0.76–1.27)
DEPRECATED RDW RBC AUTO: 41 FL (ref 37–54)
EOSINOPHIL # BLD AUTO: 0.09 10*3/MM3 (ref 0–0.4)
EOSINOPHIL NFR BLD AUTO: 1.4 % (ref 0.3–6.2)
ERYTHROCYTE [DISTWIDTH] IN BLOOD BY AUTOMATED COUNT: 12.5 % (ref 12.3–15.4)
GFR SERPL CREATININE-BSD FRML MDRD: 47 ML/MIN/1.73
GLOBULIN UR ELPH-MCNC: 2.5 GM/DL
GLUCOSE SERPL-MCNC: 95 MG/DL (ref 65–99)
GLUCOSE UR STRIP-MCNC: NEGATIVE MG/DL
HCT VFR BLD AUTO: 44.4 % (ref 37.5–51)
HGB BLD-MCNC: 14.6 G/DL (ref 13–17.7)
HGB UR QL STRIP.AUTO: ABNORMAL
HYALINE CASTS UR QL AUTO: ABNORMAL /LPF
IMM GRANULOCYTES # BLD AUTO: 0.01 10*3/MM3 (ref 0–0.05)
IMM GRANULOCYTES NFR BLD AUTO: 0.2 % (ref 0–0.5)
KETONES UR QL STRIP: NEGATIVE
LEUKOCYTE ESTERASE UR QL STRIP.AUTO: NEGATIVE
LYMPHOCYTES # BLD AUTO: 0.87 10*3/MM3 (ref 0.7–3.1)
LYMPHOCYTES NFR BLD AUTO: 13.1 % (ref 19.6–45.3)
MAGNESIUM SERPL-MCNC: 2 MG/DL (ref 1.6–2.6)
MCH RBC QN AUTO: 29.7 PG (ref 26.6–33)
MCHC RBC AUTO-ENTMCNC: 32.9 G/DL (ref 31.5–35.7)
MCV RBC AUTO: 90.2 FL (ref 79–97)
MONOCYTES # BLD AUTO: 0.51 10*3/MM3 (ref 0.1–0.9)
MONOCYTES NFR BLD AUTO: 7.7 % (ref 5–12)
NEUTROPHILS NFR BLD AUTO: 5.15 10*3/MM3 (ref 1.7–7)
NEUTROPHILS NFR BLD AUTO: 77.1 % (ref 42.7–76)
NITRITE UR QL STRIP: NEGATIVE
NRBC BLD AUTO-RTO: 0 /100 WBC (ref 0–0.2)
PH UR STRIP.AUTO: 6.5 [PH] (ref 5–8)
PLATELET # BLD AUTO: 148 10*3/MM3 (ref 140–450)
PMV BLD AUTO: 9.4 FL (ref 6–12)
POTASSIUM SERPL-SCNC: 4.3 MMOL/L (ref 3.5–5.2)
PROT SERPL-MCNC: 6.5 G/DL (ref 6–8.5)
PROT UR QL STRIP: NEGATIVE
RBC # BLD AUTO: 4.92 10*6/MM3 (ref 4.14–5.8)
RBC # UR: ABNORMAL /HPF
REF LAB TEST METHOD: ABNORMAL
SARS-COV-2 RNA PNL SPEC NAA+PROBE: NOT DETECTED
SODIUM SERPL-SCNC: 139 MMOL/L (ref 136–145)
SP GR UR STRIP: 1.01 (ref 1–1.03)
SQUAMOUS #/AREA URNS HPF: ABNORMAL /HPF
UROBILINOGEN UR QL STRIP: ABNORMAL
WBC # BLD AUTO: 6.66 10*3/MM3 (ref 3.4–10.8)
WBC UR QL AUTO: ABNORMAL /HPF

## 2020-11-02 PROCEDURE — 80053 COMPREHEN METABOLIC PANEL: CPT | Performed by: FAMILY MEDICINE

## 2020-11-02 PROCEDURE — G0378 HOSPITAL OBSERVATION PER HR: HCPCS

## 2020-11-02 PROCEDURE — 96374 THER/PROPH/DIAG INJ IV PUSH: CPT

## 2020-11-02 PROCEDURE — C9803 HOPD COVID-19 SPEC COLLECT: HCPCS

## 2020-11-02 PROCEDURE — 83735 ASSAY OF MAGNESIUM: CPT | Performed by: FAMILY MEDICINE

## 2020-11-02 PROCEDURE — 81001 URINALYSIS AUTO W/SCOPE: CPT | Performed by: UROLOGY

## 2020-11-02 PROCEDURE — 87086 URINE CULTURE/COLONY COUNT: CPT | Performed by: UROLOGY

## 2020-11-02 PROCEDURE — 74176 CT ABD & PELVIS W/O CONTRAST: CPT

## 2020-11-02 PROCEDURE — 96361 HYDRATE IV INFUSION ADD-ON: CPT

## 2020-11-02 PROCEDURE — 85025 COMPLETE CBC W/AUTO DIFF WBC: CPT | Performed by: FAMILY MEDICINE

## 2020-11-02 PROCEDURE — 96376 TX/PRO/DX INJ SAME DRUG ADON: CPT

## 2020-11-02 PROCEDURE — 99220 PR INITIAL OBSERVATION CARE/DAY 70 MINUTES: CPT | Performed by: FAMILY MEDICINE

## 2020-11-02 PROCEDURE — 87635 SARS-COV-2 COVID-19 AMP PRB: CPT | Performed by: UROLOGY

## 2020-11-02 PROCEDURE — 99244 OFF/OP CNSLTJ NEW/EST MOD 40: CPT | Performed by: UROLOGY

## 2020-11-02 PROCEDURE — 25010000002 MORPHINE SULFATE (PF) 2 MG/ML SOLUTION: Performed by: FAMILY MEDICINE

## 2020-11-02 RX ORDER — MORPHINE SULFATE 2 MG/ML
1 INJECTION, SOLUTION INTRAMUSCULAR; INTRAVENOUS EVERY 4 HOURS PRN
Status: DISCONTINUED | OUTPATIENT
Start: 2020-11-02 | End: 2020-11-03 | Stop reason: HOSPADM

## 2020-11-02 RX ORDER — ESCITALOPRAM OXALATE 10 MG/1
10 TABLET ORAL DAILY
Status: DISCONTINUED | OUTPATIENT
Start: 2020-11-03 | End: 2020-11-03 | Stop reason: HOSPADM

## 2020-11-02 RX ORDER — ONDANSETRON 2 MG/ML
4 INJECTION INTRAMUSCULAR; INTRAVENOUS EVERY 6 HOURS PRN
Status: DISCONTINUED | OUTPATIENT
Start: 2020-11-02 | End: 2020-11-03 | Stop reason: HOSPADM

## 2020-11-02 RX ORDER — SODIUM CHLORIDE 0.9 % (FLUSH) 0.9 %
10 SYRINGE (ML) INJECTION AS NEEDED
Status: DISCONTINUED | OUTPATIENT
Start: 2020-11-02 | End: 2020-11-03 | Stop reason: HOSPADM

## 2020-11-02 RX ORDER — ONDANSETRON 4 MG/1
4 TABLET, FILM COATED ORAL EVERY 6 HOURS PRN
Status: DISCONTINUED | OUTPATIENT
Start: 2020-11-02 | End: 2020-11-03 | Stop reason: HOSPADM

## 2020-11-02 RX ORDER — HYDROCODONE BITARTRATE AND ACETAMINOPHEN 5; 325 MG/1; MG/1
1 TABLET ORAL EVERY 8 HOURS PRN
Status: DISCONTINUED | OUTPATIENT
Start: 2020-11-02 | End: 2020-11-03 | Stop reason: HOSPADM

## 2020-11-02 RX ORDER — SODIUM CHLORIDE 9 MG/ML
75 INJECTION, SOLUTION INTRAVENOUS CONTINUOUS
Status: DISCONTINUED | OUTPATIENT
Start: 2020-11-02 | End: 2020-11-03 | Stop reason: HOSPADM

## 2020-11-02 RX ORDER — SODIUM CHLORIDE 0.9 % (FLUSH) 0.9 %
10 SYRINGE (ML) INJECTION EVERY 12 HOURS SCHEDULED
Status: DISCONTINUED | OUTPATIENT
Start: 2020-11-02 | End: 2020-11-03 | Stop reason: HOSPADM

## 2020-11-02 RX ORDER — NALOXONE HCL 0.4 MG/ML
0.4 VIAL (ML) INJECTION
Status: DISCONTINUED | OUTPATIENT
Start: 2020-11-02 | End: 2020-11-03 | Stop reason: HOSPADM

## 2020-11-02 RX ADMIN — SODIUM CHLORIDE 75 ML/HR: 9 INJECTION, SOLUTION INTRAVENOUS at 15:32

## 2020-11-02 RX ADMIN — MORPHINE SULFATE 1 MG: 2 INJECTION, SOLUTION INTRAMUSCULAR; INTRAVENOUS at 15:31

## 2020-11-02 RX ADMIN — MORPHINE SULFATE 1 MG: 2 INJECTION, SOLUTION INTRAMUSCULAR; INTRAVENOUS at 19:55

## 2020-11-02 RX ADMIN — SODIUM CHLORIDE, PRESERVATIVE FREE 10 ML: 5 INJECTION INTRAVENOUS at 20:10

## 2020-11-02 NOTE — H&P
[facility] Medicine Services  HISTORY AND PHYSICAL    Primary Care Physician: Nicola Perry DO    Subjective     Chief Complaint: Acute right flank abdominal pain/dysuria      History of Present Illness:   Patient is a 55-year-old male who presents today with complaints of right flank/lower abdomen pain.  Sudden onset occurring approximately 36 hours ago.  He has had some complaints of vague dysuria additionally, decreased frequency of urination and occasional burning with urination.  He denies any aurora hematuria.  He has a history of nephrolithiasis with ureteral intervention in the past.  He denies any significant changes to his hydration status.  No reports of any fever, chills, he does admit to some malaise and fatigue that he attributes to the worsened abdominal discomfort.  He denies any bright red blood or black or tarry stools.  He denies any abnormal rashes.      Review of Systems   1. Constitutional: Negative for fever. Negative for chills, diaphoresis.  Malaise/fatigue, and without unexpected weight change.   2. HENT: No dysphagia; no changes to vision/hearing/smell/taste; no epistaxis  3. Eyes: Negative for redness and visual disturbance.   4. Respiratory: negative for shortness of breath. Negative for chest pain . Negative for cough and chest tightness.   5. Cardiovascular: Negative for chest pain and palpitations.   6. Gastrointestinal: Negative for abdominal distention and blood in stool.  Right flank abdominal pain, radiating to the groin.  7. Endocrine: Negative for cold intolerance and heat intolerance.   8. Genitourinary: Negative for difficulty urinating, dysuria and frequency.   9. Musculoskeletal: Negative for arthralgias, back pain and myalgias.   10. Skin: Negative for color change, rash and wound.   11. Neurological: Negative for syncope, weakness and headaches.   12. Hematological: Negative for adenopathy. Does not bruise/bleed easily.  "  13. Psychiatric/Behavioral: Negative for confusion. The patient is not nervous/anxious.     Past Medical History:   Past Medical History:   Diagnosis Date   • GERD (gastroesophageal reflux disease)    • Kidney stone    • Migraine headache        Past Surgical History:  Past Surgical History:   Procedure Laterality Date   • ROTATOR CUFF REPAIR         Family History: family history includes Cancer in his paternal grandmother and paternal uncle.    Social History:  reports that he has never smoked. He has never used smokeless tobacco. He reports current alcohol use. He reports that he does not use drugs.    Medications:  Medications Prior to Admission   Medication Sig Dispense Refill Last Dose   • aspirin 81 MG tablet Take 1 tablet by mouth Daily. 90 tablet 3 11/2/2020 at Unknown time   • escitalopram (Lexapro) 10 MG tablet Take 1 tablet by mouth Daily. 90 tablet 3 11/2/2020 at Unknown time   • loratadine (CLARITIN) 10 MG tablet Take 1 tablet by mouth Daily. 90 tablet 3 11/2/2020 at Unknown time   • meloxicam (Mobic) 7.5 MG tablet Take 1 tablet by mouth Daily. 90 tablet 3 11/2/2020 at Unknown time   • Multiple Vitamin (MULTI VITAMIN DAILY PO) Take  by mouth.   11/1/2020 at Unknown time   • HYDROcodone-acetaminophen (Norco) 5-325 MG per tablet Take 1 tablet by mouth Every 8 (Eight) Hours As Needed for Moderate Pain . 20 tablet 0 Unknown at Unknown time       Allergies:  No Known Allergies      Objective     Physical Exam:  Vital Signs: /87 (BP Location: Left arm, Patient Position: Lying)   Pulse 52   Temp 98.2 °F (36.8 °C) (Oral)   Resp 17   Ht 175.3 cm (69\")   Wt 80.8 kg (178 lb 2.1 oz)   SpO2 99%   BMI 26.31 kg/m²      General Appearance: alert, oriented x 3, no acute distress.  Skin: warm and dry.   HEENT: Atraumatic.  pupils round and reactive to light and accommodation, oral mucosa pink and moist.  Nares patent without epistaxis.  External auditory canals are patent tympanic membranes intact.  Neck: " supple, no JVD, trachea midline.  No thyromegaly  Lungs: CTA, unlabored breathing effort.  Heart: RRR, normal S1 and S2, no S3, no rub.  Abdomen: soft, no palpable bladder, present bowel sounds to auscultation ×4.  No guarding or rigidity.  Mild right CVA tenderness.  Extremities: no clubbing, cyanosis or edema.  Good range of motion actively and passively.  Symmetric muscle strength and development  Neuro: normal speech and mental status.  Cranial nerves II through XII intact.  No anosmia. DTR 2+; proprioception intact.  No focal motor/sensory deficits.          Results Reviewed:    Lab Results (last 72 hours)     Reviewed          Imaging Results (Last 72 Hours)     Procedure Component Value Units Date/Time   CT Abdomen Pelvis Stone Protocol [[id number]] Sebastian as Reviewed   Order Status: Completed Collected: 11/02/20 1234    Updated: 11/02/20 1239   Narrative:     PROCEDURE: CT ABDOMEN PELVIS STONE PROTOCOL-       HISTORY: Flank pain, kidney stone suspected       COMPARISON: None .       PROCEDURE: Axial images were obtained from the lung bases through the   pubic symphysis without intravenous contrast. .       FINDINGS:       ABDOMEN: The lung bases are clear. The heart size is normal. There is a   small to moderate sized hiatal hernia. The limited noncontrast images of   the liver demonstrates a hypodense lesion in the left lobe of the liver   consistent with cyst. There is also a smaller cyst in the medial right   lobe of the liver adjacent to the caudate lobe. The spleen is normal. No   adrenal masses are seen.  The pancreas has an unremarkable unenhanced   appearance.. The aorta is normal in caliber. There is no significant   free fluid or adenopathy.  There are nonobstructing stones seen   bilaterally. There is moderate right hydronephrosis. A 6 mm stone is   seen in the distal right ureter which measures 900 HU. The abdominal   portions of the GI tract are unremarkable.       PELVIS: The appendix is normal.  There are colonic diverticula without CT   evidence of diverticulitis. The urinary bladder is unremarkable. There   is no significant fluid or adenopathy.       Impression:     6 mm stone in the distal right ureter producing moderate   hydronephrosis.                   672.84 mGy.cm.   12.65 mGy       This study was performed with techniques to keep radiation doses as low   as reasonably achievable (ALARA). Individualized dose reduction   techniques using automated exposure control or adjustment of mA and/or   kV according to the patient size were employed.       This report was finalized on 11/2/2020 12:36 PM by Giovanni Mcfadden M.D..               ECG/EMG Results (most recent)     None          I have personally reviewed and interpreted available lab data, radiology studies and ECG obtained at time of admission.     Assessment / Plan     Assessment/Problem List:     Acute abdominal pain in right flank    Ureteral stone with hydronephrosis    Obstruction of right ureter          Plan:  Patient will be admitted to [facility].  Stat labs to include CBC/CMP/magnesium level.  Patient will be kept n.p.o. after midnight.  IV hydration with normal saline at 75 mL's per hour.  CT scan today demonstrated distal right obstructing ureteral stone, 6 mm, with moderate hydronephrosis.  I have discussed the case with urology, Dr. Delgado has graciously agreed to see patient while inpatient, with likely intervention tomorrow.  Analgesics and antiemetics have been ordered.  Incentive spirometry, SCUDs for DVT prophylaxis.  Holding aspirin therapy at this time.  I have discussed the case with hospitalist as well.      Discussed plan of care in detail with pt today; pt verb understanding and agrees; counseled for approx 30 min of total 50 min exam time.      Nicola Perry,  11/02/20 17:50 EST    Please note that portions of this note may have been completed with a voice recognition program. Efforts were  made to edit the dictations, but occasionally words are mistranscribed.

## 2020-11-02 NOTE — CONSULTS
Chief Complaint  Kidney Stone    HPI  Mr. Moraes is a 55 y.o. male who presents for further management of nephrolithiasis.    He presented to his PCP Dr. Perry and was diagnosed with a 7 mm distal right ureteral stone. He presents today for admission and follow up.  He is still having flank pain.  No fever, chills, nausea, vomiting.  No dysuria.    Stone related history:  Family history of kidney stones  yes  Renal disease or anatomic abnormality: no  Malabsorptive disease or gastric bypass: no  Frequent UTI's    no  Parathyroid disease    no        Past Medical History  Past Medical History:   Diagnosis Date   • GERD (gastroesophageal reflux disease)    • Kidney stone    • Migraine headache        Past Surgical History  Past Surgical History:   Procedure Laterality Date   • ROTATOR CUFF REPAIR         Medications    Current Facility-Administered Medications:   •  [START ON 11/3/2020] escitalopram (LEXAPRO) tablet 10 mg, 10 mg, Oral, Daily, Greg Perryin K, DO  •  HYDROcodone-acetaminophen (NORCO) 5-325 MG per tablet 1 tablet, 1 tablet, Oral, Q8H PRN, Greg Perryin K, DO  •  Morphine sulfate (PF) injection 1 mg, 1 mg, Intravenous, Q4H PRN, 1 mg at 11/02/20 1531 **AND** naloxone (NARCAN) injection 0.4 mg, 0.4 mg, Intravenous, Q5 Min PRN, Greg Perryin K, DO  •  ondansetron (ZOFRAN) tablet 4 mg, 4 mg, Oral, Q6H PRN **OR** ondansetron (ZOFRAN) injection 4 mg, 4 mg, Intravenous, Q6H PRN, Greg Perryin K, DO  •  sodium chloride 0.9 % flush 10 mL, 10 mL, Intravenous, Q12H, Vicky, Nicola K, DO  •  sodium chloride 0.9 % flush 10 mL, 10 mL, Intravenous, PRN, Greg Perryin K, DO  •  sodium chloride 0.9 % infusion, 75 mL/hr, Intravenous, Continuous, Nicola Perry K, DO, Last Rate: 75 mL/hr at 11/02/20 1532, 75 mL/hr at 11/02/20 1532    Allergies  No Known Allergies    Social History  Social History     Socioeconomic History   • Marital status:      Spouse name: Not on file   • Number of children: Not on file   •  "Years of education: Not on file   • Highest education level: Not on file   Tobacco Use   • Smoking status: Never Smoker   • Smokeless tobacco: Never Used   Substance and Sexual Activity   • Alcohol use: Yes   • Drug use: No       Family History  Family History   Problem Relation Age of Onset   • Cancer Paternal Uncle    • Cancer Paternal Grandmother        Review of Systems  Constitutional: No fevers or chills  Skin: Negative for rash  Endocrine: No heat/cold intolerance   Cardiovascular: Negative for chest pain or dyspnea on exertion  Respiratory: Negative for shortness of breath or wheezing  Gastrointestinal: No constipation, nausea or vomiting  Genitourinary: No gross hematuria   Musculoskeletal: Negative for low back pain  Neurological:  Negative for frequent headaches or dizziness  Lymph/Heme: Negative for leg swelling or calf pain.    All other systems reviewed and negative    Physical Exam  Visit Vitals  /87 (BP Location: Left arm, Patient Position: Lying)   Pulse 52   Temp 98.2 °F (36.8 °C) (Oral)   Resp 17   Ht 175.3 cm (69\")   Wt 80.8 kg (178 lb 2.1 oz)   SpO2 99%   BMI 26.31 kg/m²     Constitutional: NAD, WDWN.   HEENT: NCAT. Conjunctivae normal.  MMM.  Endocrine: no clear thyromegaly    Cardiovascular: Regular rate.  Pulmonary/Chest: Respirations are even and non-labored bilaterally.  Back: Minimal right CVA tenderness.  Neurological: A + O x 3.  Cranial Nerves II-XII grossly intact.  Extremities: LORENA x 4, Warm. No clubbing.  No cyanosis.    Skin: Pink, warm and dry.  No rashes noted.    Labs  Lab Results   Component Value Date    GLUCOSE 95 11/02/2020    BUN 17 11/02/2020    CREATININE 1.55 (H) 11/02/2020    EGFRIFNONA 47 (L) 11/02/2020    EGFRIFAFRI 85 03/17/2020    BCR 11.0 11/02/2020    K 4.3 11/02/2020    CO2 26.0 11/02/2020    CALCIUM 9.1 11/02/2020    PROTENTOTREF 6.7 03/17/2020    ALBUMIN 4.00 11/02/2020    LABIL2 1.7 03/17/2020    AST 17 11/02/2020    ALT 21 11/02/2020       Lab Results "   Component Value Date    WBC 6.66 11/02/2020    HGB 14.6 11/02/2020    HCT 44.4 11/02/2020    MCV 90.2 11/02/2020     11/02/2020       No results found for: LDH, URICACID    Lab Results   Component Value Date    CALCIUM 9.1 11/02/2020       Brief Urine Lab Results     None          No results found for: URINECX    )No components found for: STONEANALYSI      Radiologic Studies  Ct Abdomen Pelvis Stone Protocol    Result Date: 11/2/2020  Impression: 6 mm stone in the distal right ureter producing moderate hydronephrosis.     672.84 mGy.cm. 12.65 mGy  This study was performed with techniques to keep radiation doses as low as reasonably achievable (ALARA). Individualized dose reduction techniques using automated exposure control or adjustment of mA and/or kV according to the patient size were employed.  This report was finalized on 11/2/2020 12:36 PM by Giovanni Mcfadden M.D..      I have personally reviewed these labs and images.      Assessment  Mr. Moraes is a 55 y.o. male with 7 mm right distal obstructing ureteral stone, as well as multiple small bilateral renal stones.    We had informed discussion about the causes of stones, in the main treatments for nephrolithiasis in 2019.  The 3 main surgical treatments for stones are percutaneous nephrolithotomy, ureteroscopy and laser lithotripsy, and shockwave lithotripsy.  Based on the stone size and location, I have recommended ureteroscopy and laser lithotripsy.  We discussed the risks, benefits, and alternatives to this therapy.  The main risks that we discussed were bleeding, urinary infection, damage to nearby structures, need for further procedures, and cardiopulmonary complications from anesthesia.  The patient voiced understanding and wished to proceed.     Plan  1. Consented for right URS/HLL stent add-on tomorrow afternoon.  He will likely be able to be discharged after if he is feeling well.  2.  Urinalysis  3.  Rapid Covid test

## 2020-11-03 ENCOUNTER — ANESTHESIA EVENT (OUTPATIENT)
Dept: PERIOP | Facility: HOSPITAL | Age: 55
End: 2020-11-03

## 2020-11-03 ENCOUNTER — ANESTHESIA (OUTPATIENT)
Dept: PERIOP | Facility: HOSPITAL | Age: 55
End: 2020-11-03

## 2020-11-03 ENCOUNTER — READMISSION MANAGEMENT (OUTPATIENT)
Dept: CALL CENTER | Facility: HOSPITAL | Age: 55
End: 2020-11-03

## 2020-11-03 VITALS
SYSTOLIC BLOOD PRESSURE: 133 MMHG | DIASTOLIC BLOOD PRESSURE: 90 MMHG | TEMPERATURE: 98.3 F | WEIGHT: 178.79 LBS | HEART RATE: 61 BPM | OXYGEN SATURATION: 97 % | BODY MASS INDEX: 26.48 KG/M2 | HEIGHT: 69 IN | RESPIRATION RATE: 17 BRPM

## 2020-11-03 LAB
ANION GAP SERPL CALCULATED.3IONS-SCNC: 6.2 MMOL/L (ref 5–15)
BACTERIA SPEC AEROBE CULT: NO GROWTH
BASOPHILS # BLD AUTO: 0.04 10*3/MM3 (ref 0–0.2)
BASOPHILS NFR BLD AUTO: 1 % (ref 0–1.5)
BUN SERPL-MCNC: 17 MG/DL (ref 6–20)
BUN/CREAT SERPL: 14.3 (ref 7–25)
CALCIUM SPEC-SCNC: 9 MG/DL (ref 8.6–10.5)
CHLORIDE SERPL-SCNC: 106 MMOL/L (ref 98–107)
CO2 SERPL-SCNC: 27.8 MMOL/L (ref 22–29)
CREAT SERPL-MCNC: 1.19 MG/DL (ref 0.76–1.27)
DEPRECATED RDW RBC AUTO: 40.9 FL (ref 37–54)
EOSINOPHIL # BLD AUTO: 0.2 10*3/MM3 (ref 0–0.4)
EOSINOPHIL NFR BLD AUTO: 5.1 % (ref 0.3–6.2)
ERYTHROCYTE [DISTWIDTH] IN BLOOD BY AUTOMATED COUNT: 12.3 % (ref 12.3–15.4)
GFR SERPL CREATININE-BSD FRML MDRD: 63 ML/MIN/1.73
GLUCOSE SERPL-MCNC: 98 MG/DL (ref 65–99)
HCT VFR BLD AUTO: 42.6 % (ref 37.5–51)
HGB BLD-MCNC: 14.1 G/DL (ref 13–17.7)
IMM GRANULOCYTES # BLD AUTO: 0.05 10*3/MM3 (ref 0–0.05)
IMM GRANULOCYTES NFR BLD AUTO: 1.3 % (ref 0–0.5)
LYMPHOCYTES # BLD AUTO: 1.19 10*3/MM3 (ref 0.7–3.1)
LYMPHOCYTES NFR BLD AUTO: 30.5 % (ref 19.6–45.3)
MCH RBC QN AUTO: 29.6 PG (ref 26.6–33)
MCHC RBC AUTO-ENTMCNC: 33.1 G/DL (ref 31.5–35.7)
MCV RBC AUTO: 89.5 FL (ref 79–97)
MONOCYTES # BLD AUTO: 0.43 10*3/MM3 (ref 0.1–0.9)
MONOCYTES NFR BLD AUTO: 11 % (ref 5–12)
NEUTROPHILS NFR BLD AUTO: 1.99 10*3/MM3 (ref 1.7–7)
NEUTROPHILS NFR BLD AUTO: 51.1 % (ref 42.7–76)
NRBC BLD AUTO-RTO: 0 /100 WBC (ref 0–0.2)
PLATELET # BLD AUTO: 149 10*3/MM3 (ref 140–450)
PMV BLD AUTO: 9.5 FL (ref 6–12)
POTASSIUM SERPL-SCNC: 4.6 MMOL/L (ref 3.5–5.2)
PROCALCITONIN SERPL-MCNC: 0.04 NG/ML (ref 0–0.25)
RBC # BLD AUTO: 4.76 10*6/MM3 (ref 4.14–5.8)
SODIUM SERPL-SCNC: 140 MMOL/L (ref 136–145)
WBC # BLD AUTO: 3.9 10*3/MM3 (ref 3.4–10.8)

## 2020-11-03 PROCEDURE — 85025 COMPLETE CBC W/AUTO DIFF WBC: CPT | Performed by: FAMILY MEDICINE

## 2020-11-03 PROCEDURE — 25010000002 PROPOFOL 200 MG/20ML EMULSION: Performed by: NURSE ANESTHETIST, CERTIFIED REGISTERED

## 2020-11-03 PROCEDURE — 82365 CALCULUS SPECTROSCOPY: CPT | Performed by: UROLOGY

## 2020-11-03 PROCEDURE — 96361 HYDRATE IV INFUSION ADD-ON: CPT

## 2020-11-03 PROCEDURE — G0378 HOSPITAL OBSERVATION PER HR: HCPCS

## 2020-11-03 PROCEDURE — 84145 PROCALCITONIN (PCT): CPT | Performed by: EMERGENCY MEDICINE

## 2020-11-03 PROCEDURE — 25010000002 IOPAMIDOL 61 % SOLUTION: Performed by: UROLOGY

## 2020-11-03 PROCEDURE — 99203 OFFICE O/P NEW LOW 30 MIN: CPT | Performed by: EMERGENCY MEDICINE

## 2020-11-03 PROCEDURE — 52356 CYSTO/URETERO W/LITHOTRIPSY: CPT | Performed by: UROLOGY

## 2020-11-03 PROCEDURE — C1769 GUIDE WIRE: HCPCS | Performed by: UROLOGY

## 2020-11-03 PROCEDURE — 80048 BASIC METABOLIC PNL TOTAL CA: CPT | Performed by: FAMILY MEDICINE

## 2020-11-03 PROCEDURE — 25010000002 ONDANSETRON PER 1 MG: Performed by: NURSE ANESTHETIST, CERTIFIED REGISTERED

## 2020-11-03 PROCEDURE — C1758 CATHETER, URETERAL: HCPCS | Performed by: UROLOGY

## 2020-11-03 PROCEDURE — 25010000003 CEFAZOLIN SODIUM-DEXTROSE 2-3 GM-%(50ML) RECONSTITUTED SOLUTION: Performed by: UROLOGY

## 2020-11-03 PROCEDURE — 25010000002 MIDAZOLAM PER 1MG: Performed by: NURSE ANESTHETIST, CERTIFIED REGISTERED

## 2020-11-03 PROCEDURE — 25010000002 DEXAMETHASONE PER 1 MG: Performed by: NURSE ANESTHETIST, CERTIFIED REGISTERED

## 2020-11-03 PROCEDURE — 25010000002 FENTANYL CITRATE (PF) 100 MCG/2ML SOLUTION: Performed by: NURSE ANESTHETIST, CERTIFIED REGISTERED

## 2020-11-03 PROCEDURE — C2617 STENT, NON-COR, TEM W/O DEL: HCPCS | Performed by: UROLOGY

## 2020-11-03 PROCEDURE — C1894 INTRO/SHEATH, NON-LASER: HCPCS | Performed by: UROLOGY

## 2020-11-03 DEVICE — URETERAL STENT
Type: IMPLANTABLE DEVICE | Site: URETER | Status: FUNCTIONAL
Brand: CONTOUR™

## 2020-11-03 RX ORDER — LIDOCAINE HYDROCHLORIDE 20 MG/ML
INJECTION, SOLUTION INTRAVENOUS AS NEEDED
Status: DISCONTINUED | OUTPATIENT
Start: 2020-11-03 | End: 2020-11-03 | Stop reason: SURG

## 2020-11-03 RX ORDER — PROPOFOL 10 MG/ML
INJECTION, EMULSION INTRAVENOUS AS NEEDED
Status: DISCONTINUED | OUTPATIENT
Start: 2020-11-03 | End: 2020-11-03 | Stop reason: SURG

## 2020-11-03 RX ORDER — DOCUSATE SODIUM 100 MG/1
100 CAPSULE, LIQUID FILLED ORAL 2 TIMES DAILY
Qty: 15 CAPSULE | Refills: 1 | Status: SHIPPED | OUTPATIENT
Start: 2020-11-03 | End: 2021-02-18

## 2020-11-03 RX ORDER — ONDANSETRON 2 MG/ML
INJECTION INTRAMUSCULAR; INTRAVENOUS AS NEEDED
Status: DISCONTINUED | OUTPATIENT
Start: 2020-11-03 | End: 2020-11-03 | Stop reason: SURG

## 2020-11-03 RX ORDER — SULFAMETHOXAZOLE AND TRIMETHOPRIM 800; 160 MG/1; MG/1
1 TABLET ORAL 2 TIMES DAILY
Qty: 6 TABLET | Refills: 0 | Status: SHIPPED | OUTPATIENT
Start: 2020-11-03 | End: 2021-02-18

## 2020-11-03 RX ORDER — PROMETHAZINE HYDROCHLORIDE 25 MG/1
25 SUPPOSITORY RECTAL ONCE AS NEEDED
Status: DISCONTINUED | OUTPATIENT
Start: 2020-11-03 | End: 2020-11-03 | Stop reason: HOSPADM

## 2020-11-03 RX ORDER — PHENAZOPYRIDINE HYDROCHLORIDE 100 MG/1
100 TABLET, FILM COATED ORAL 3 TIMES DAILY PRN
Qty: 21 TABLET | Refills: 0 | Status: SHIPPED | OUTPATIENT
Start: 2020-11-03 | End: 2021-02-18

## 2020-11-03 RX ORDER — SODIUM CHLORIDE, SODIUM LACTATE, POTASSIUM CHLORIDE, CALCIUM CHLORIDE 600; 310; 30; 20 MG/100ML; MG/100ML; MG/100ML; MG/100ML
100 INJECTION, SOLUTION INTRAVENOUS CONTINUOUS
Status: DISCONTINUED | OUTPATIENT
Start: 2020-11-03 | End: 2020-11-03 | Stop reason: HOSPADM

## 2020-11-03 RX ORDER — ATROPA BELLADONNA AND OPIUM 16.2; 3 MG/1; MG/1
SUPPOSITORY RECTAL AS NEEDED
Status: DISCONTINUED | OUTPATIENT
Start: 2020-11-03 | End: 2020-11-03 | Stop reason: HOSPADM

## 2020-11-03 RX ORDER — TAMSULOSIN HYDROCHLORIDE 0.4 MG/1
1 CAPSULE ORAL NIGHTLY
Qty: 10 CAPSULE | Refills: 0 | Status: SHIPPED | OUTPATIENT
Start: 2020-11-03 | End: 2021-02-18

## 2020-11-03 RX ORDER — OXYBUTYNIN CHLORIDE 10 MG/1
10 TABLET, EXTENDED RELEASE ORAL DAILY PRN
Qty: 10 TABLET | Refills: 0 | Status: SHIPPED | OUTPATIENT
Start: 2020-11-03 | End: 2021-02-18

## 2020-11-03 RX ORDER — PROMETHAZINE HYDROCHLORIDE 25 MG/1
25 TABLET ORAL ONCE AS NEEDED
Status: DISCONTINUED | OUTPATIENT
Start: 2020-11-03 | End: 2020-11-03 | Stop reason: HOSPADM

## 2020-11-03 RX ORDER — OXYCODONE HYDROCHLORIDE 5 MG/1
5 TABLET ORAL EVERY 6 HOURS PRN
Qty: 5 TABLET | Refills: 0 | Status: SHIPPED | OUTPATIENT
Start: 2020-11-03 | End: 2021-02-18

## 2020-11-03 RX ORDER — KETAMINE HCL IN NACL, ISO-OSM 100MG/10ML
SYRINGE (ML) INJECTION AS NEEDED
Status: DISCONTINUED | OUTPATIENT
Start: 2020-11-03 | End: 2020-11-03 | Stop reason: SURG

## 2020-11-03 RX ORDER — ACETAMINOPHEN 325 MG/1
650 TABLET ORAL EVERY 6 HOURS
Qty: 30 TABLET | Refills: 0 | Status: SHIPPED | OUTPATIENT
Start: 2020-11-03 | End: 2020-11-06

## 2020-11-03 RX ORDER — ONDANSETRON 2 MG/ML
4 INJECTION INTRAMUSCULAR; INTRAVENOUS ONCE AS NEEDED
Status: DISCONTINUED | OUTPATIENT
Start: 2020-11-03 | End: 2020-11-03 | Stop reason: HOSPADM

## 2020-11-03 RX ORDER — DEXAMETHASONE SODIUM PHOSPHATE 4 MG/ML
INJECTION, SOLUTION INTRA-ARTICULAR; INTRALESIONAL; INTRAMUSCULAR; INTRAVENOUS; SOFT TISSUE AS NEEDED
Status: DISCONTINUED | OUTPATIENT
Start: 2020-11-03 | End: 2020-11-03 | Stop reason: SURG

## 2020-11-03 RX ORDER — MIDAZOLAM HYDROCHLORIDE 2 MG/2ML
INJECTION, SOLUTION INTRAMUSCULAR; INTRAVENOUS AS NEEDED
Status: DISCONTINUED | OUTPATIENT
Start: 2020-11-03 | End: 2020-11-03 | Stop reason: SURG

## 2020-11-03 RX ORDER — IPRATROPIUM BROMIDE AND ALBUTEROL SULFATE 2.5; .5 MG/3ML; MG/3ML
3 SOLUTION RESPIRATORY (INHALATION) ONCE AS NEEDED
Status: DISCONTINUED | OUTPATIENT
Start: 2020-11-03 | End: 2020-11-03 | Stop reason: HOSPADM

## 2020-11-03 RX ORDER — CEFAZOLIN SODIUM 2 G/50ML
2 SOLUTION INTRAVENOUS ONCE
Status: COMPLETED | OUTPATIENT
Start: 2020-11-03 | End: 2020-11-03

## 2020-11-03 RX ORDER — LORAZEPAM 2 MG/ML
0.25 INJECTION INTRAMUSCULAR AS NEEDED
Status: DISCONTINUED | OUTPATIENT
Start: 2020-11-03 | End: 2020-11-03 | Stop reason: HOSPADM

## 2020-11-03 RX ORDER — LEVOFLOXACIN 500 MG/1
500 TABLET, FILM COATED ORAL DAILY
Qty: 5 TABLET | Refills: 0 | Status: SHIPPED | OUTPATIENT
Start: 2020-11-03 | End: 2020-11-09

## 2020-11-03 RX ORDER — FENTANYL CITRATE 50 UG/ML
INJECTION, SOLUTION INTRAMUSCULAR; INTRAVENOUS AS NEEDED
Status: DISCONTINUED | OUTPATIENT
Start: 2020-11-03 | End: 2020-11-03 | Stop reason: SURG

## 2020-11-03 RX ADMIN — SODIUM CHLORIDE 75 ML/HR: 9 INJECTION, SOLUTION INTRAVENOUS at 06:11

## 2020-11-03 RX ADMIN — MIDAZOLAM HYDROCHLORIDE 2 MG: 1 INJECTION, SOLUTION INTRAMUSCULAR; INTRAVENOUS at 14:26

## 2020-11-03 RX ADMIN — Medication 20 MG: at 14:56

## 2020-11-03 RX ADMIN — Medication 30 MG: at 14:30

## 2020-11-03 RX ADMIN — SODIUM CHLORIDE, PRESERVATIVE FREE 10 ML: 5 INJECTION INTRAVENOUS at 09:12

## 2020-11-03 RX ADMIN — SODIUM CHLORIDE, POTASSIUM CHLORIDE, SODIUM LACTATE AND CALCIUM CHLORIDE 100 ML/HR: 600; 310; 30; 20 INJECTION, SOLUTION INTRAVENOUS at 13:21

## 2020-11-03 RX ADMIN — ESCITALOPRAM OXALATE 10 MG: 10 TABLET ORAL at 09:12

## 2020-11-03 RX ADMIN — HYDROCODONE BITARTRATE AND ACETAMINOPHEN 1 TABLET: 5; 325 TABLET ORAL at 16:44

## 2020-11-03 RX ADMIN — FENTANYL CITRATE 100 MCG: 50 INJECTION, SOLUTION INTRAMUSCULAR; INTRAVENOUS at 14:30

## 2020-11-03 RX ADMIN — PROPOFOL 160 MG: 10 INJECTION, EMULSION INTRAVENOUS at 14:30

## 2020-11-03 RX ADMIN — CEFAZOLIN SODIUM 2 G: 2 SOLUTION INTRAVENOUS at 14:32

## 2020-11-03 RX ADMIN — DEXAMETHASONE SODIUM PHOSPHATE 4 MG: 4 INJECTION, SOLUTION INTRAMUSCULAR; INTRAVENOUS at 14:31

## 2020-11-03 RX ADMIN — ONDANSETRON 4 MG: 2 INJECTION INTRAMUSCULAR; INTRAVENOUS at 14:30

## 2020-11-03 RX ADMIN — LIDOCAINE HYDROCHLORIDE 60 MG: 20 INJECTION, SOLUTION INTRAVENOUS at 14:26

## 2020-11-03 NOTE — PROGRESS NOTES
Discharge Planning Assessment  [facility]     Patient Name: Carl Moraes  MRN: [id number]  Today's Date: 11/3/2020    Admit Date: 11/2/2020    Discharge Needs Assessment     Row Name 11/03/20 4579       Living Environment    Lives With  spouse    Name(s) of Who Lives With Patient  Kassandra    Current Living Arrangements  home/apartment/condo    Primary Care Provided by  self    Provides Primary Care For  no one    Family Caregiver if Needed  none    Quality of Family Relationships  unable to assess    Able to Return to Prior Arrangements  yes    Living Arrangement Comments  Lives in a 2 story house with his wife.  Has Steps but does not have any trouble with them.       Resource/Environmental Concerns    Resource/Environmental Concerns  none    Transportation Concerns  car, none       Transition Planning    Patient/Family Anticipates Transition to  home    Patient/Family Anticipated Services at Transition  none       Discharge Needs Assessment    Readmission Within the Last 30 Days  no previous admission in last 30 days    Equipment Currently Used at Home  none    Concerns to be Addressed  no discharge needs identified    Anticipated Changes Related to Illness  none    Equipment Needed After Discharge  none    Provided Post Acute Provider List?  N/A    N/A Provider List Comment  No DME, HH or Skilled Nursing needed    Provided Post Acute Provider Quality & Resource List?  N/A    N/A Quality & Resource List Comment  No Needs        Discharge Plan     Row Name 11/03/20 6991       Plan    Plan Spoke to pt in room.  Has no POA or Living Will.  Lives with his wife in a 2 story house.  Address and phone number is correct.  Independent with ADLS.  Has no medical equipment.  Plans to go home at discharge and wife will transport.  Denies denies DC needs.  Cm will continue to follow.        Continued Care and Services - Admitted Since 11/2/2020    Coordination has not been started for this encounter.       Expected Discharge  Date and Time     Expected Discharge Date Expected Discharge Time    Nov 3, 2020  6:00 PM        Demographic Summary     Row Name 11/03/20 1334       General Information    Admission Type  observation    Arrived From  emergency department    Required Notices Provided  Observation Status Notice    Expected Length of Stay (LOS)  1 night    Referral Source  admission list    Reason for Consult  discharge planning     Used During This Interaction  no        Functional Status     Row Name 11/03/20 1336       Functional Status    Usual Activity Tolerance  good    Current Activity Tolerance  good    Functional Status Comments  Independent       Functional Status, IADL    Medications  independent    Meal Preparation  assistive person    Housekeeping  assistive person    Laundry  assistive person    Shopping  assistive person    IADL Comments  Self       Mental Status    General Appearance WDL  WDL        Psychosocial     Row Name 11/03/20 1336              Antionette Lynn RN

## 2020-11-03 NOTE — ANESTHESIA POSTPROCEDURE EVALUATION
Patient: Carl Moraes    Procedure Summary     Date: 11/03/20 Room / Location: [facility] /  [facility] OR    Anesthesia Start: 1423 Anesthesia Stop: 1521    Procedure: CYSTOSCOPY, RIGHT URETEROSCOPY LASER LITHOTRIPSY WITH STENT INSERTION RIGHT; RIGHT RETROGRADE; BASKET RETRIEVAL OF STONE (Right ) Diagnosis:       Ureteral stone with hydronephrosis      (Ureteral stone with hydronephrosis [N13.2])    Surgeon: Carlo Delgado MD Provider: Dirk Guzman CRNA    Anesthesia Type: general ASA Status: 2          Anesthesia Type: general    Vitals  Vitals Value Taken Time   /79 11/03/20 1524   Temp     Pulse 68 11/03/20 1524   Resp     SpO2 100 % 11/03/20 1524   Vitals shown include unvalidated device data.          Post Anesthesia Care and Evaluation    Patient location during evaluation: PACU  Patient participation: complete - patient participated  Level of consciousness: awake and sleepy but conscious  Pain management: adequate  Airway patency: patent  Anesthetic complications: No anesthetic complications  PONV Status: none  Cardiovascular status: acceptable and hemodynamically stable  Respiratory status: acceptable, spontaneous ventilation, face mask and nonlabored ventilation  Hydration status: acceptable

## 2020-11-03 NOTE — ANESTHESIA PROCEDURE NOTES
Airway  Urgency: elective    Date/Time: 11/3/2020 2:30 PM  Airway not difficult    General Information and Staff    Patient location during procedure: OR  CRNA: Dirk Guzman CRNA    Indications and Patient Condition  Indications for airway management: airway protection    Preoxygenated: yes  MILS maintained throughout  Mask difficulty assessment: 1 - vent by mask    Final Airway Details  Final airway type: supraglottic airway      Successful airway: unique  Size 4    Number of attempts at approach: 1  Assessment: lips, teeth, and gum same as pre-op and atraumatic intubation    Additional Comments  LMA placed easily without trauma. Dentition and lips as noted pre-induction. Factory cuff pressure to minimal occlusive pressure.

## 2020-11-03 NOTE — DISCHARGE SUMMARY
[facility]      Name:  Carl Moraes   Age:  55 y.o.  Sex:  male  :  1965  MRN:  [id number]   Visit Number:  [id number]    Admission Date:  2020  Date of Discharge:  11/3/2020  Primary Care Physician:  Nicola Perry DO    Discharge Diagnoses:     Acute ureterolithiasis with hydronephrosis  Acute kidney injury, POA  improving   R. flank pain secondary to above    Problem List:       Ureteral stone with hydronephrosis    Acute abdominal pain in right flank    Obstruction of right ureter    Presenting Problem:    Obstruction of right ureter [N13.5]     Consults:     Consults     Date and Time Order Name Status Description    2020 1455 Inpatient Urology Consult Completed         Consulting Physician(s)     Provider Relationship Specialty    Carlo Delgado MD Consulting Physician Urology          Procedures Performed:    Procedure(s):  URETEROSCOPY LASER LITHOTRIPSY WITH STENT INSERTION     Hospital Course:    55 M history of nephrolithiasis who presented to his PCPs office with right flank pain.  CT imaging revealed a 6 mm stone with hydronephrosis.  He was admitted to the hospital and underwent urologic intervention with Dr. Delgado.  He was examined and medically stable for discharge home.  Patient is given IV fluids, antiemetics, and provided with pain control. He was given Flomax, pain meds, and antibiotics by Dr. Delgado who he is to f/u with in 1 week.    Vital Signs:    Temp:  [98.1 °F (36.7 °C)-99.1 °F (37.3 °C)] 98.3 °F (36.8 °C)  Heart Rate:  [55-68] 61  Resp:  [10-17] 17  BP: ()/(64-92) 133/90    Physical Exam:  General: No acute distress, resting in bed  HEENT: EOMI, NC/AT  Heart: RRR  Lungs: CTAB  Abdomen: Soft, nondistended, nontender  Extremities: No edema, or cyanosis  Neurological: A&O x3, moves all extremities  Psychological: Mood and affect appropriate, speech is coherent  Skin: warm, dry    Pertinent Lab Results:     Results  from last 7 days   Lab Units 11/03/20  0618 11/02/20  1508   SODIUM mmol/L 140 139   POTASSIUM mmol/L 4.6 4.3   CHLORIDE mmol/L 106 103   CO2 mmol/L 27.8 26.0   BUN mg/dL 17 17   CREATININE mg/dL 1.19 1.55*   CALCIUM mg/dL 9.0 9.1   BILIRUBIN mg/dL  --  0.7   ALK PHOS U/L  --  67   ALT (SGPT) U/L  --  21   AST (SGOT) U/L  --  17   GLUCOSE mg/dL 98 95     Results from last 7 days   Lab Units 11/03/20  0618 11/02/20  1508   WBC 10*3/mm3 3.90 6.66   HEMOGLOBIN g/dL 14.1 14.6   HEMATOCRIT % 42.6 44.4   PLATELETS 10*3/mm3 149 148                             Results from last 7 days   Lab Units 11/02/20  1802   COLOR UA  Yellow   GLUCOSE UA  Negative   KETONES UA  Negative   LEUKOCYTES UA  Negative   PH, URINE  6.5   BILIRUBIN UA  Negative   UROBILINOGEN UA  0.2 E.U./dL     Pain Management Panel     There is no flowsheet data to display.        Results from last 7 days   Lab Units 11/02/20  1802   URINECX  No growth       Pertinent Radiology Results:    Imaging Results (All)     None        Condition on Discharge:      Stable.    Code status during the hospital stay:    Code Status and Medical Interventions:   Ordered at: 11/02/20 1455     Level Of Support Discussed With:    Patient     Code Status:    CPR     Medical Interventions (Level of Support Prior to Arrest):    Full       Discharge Disposition:    Home or Self Care    Discharge Medications:       Discharge Medications      New Medications      Instructions Start Date   acetaminophen 325 MG tablet  Commonly known as: Tylenol   650 mg, Oral, Every 6 Hours      docusate sodium 100 MG capsule  Commonly known as: Colace   100 mg, Oral, 2 Times Daily, If taking percocet      levoFLOXacin 500 MG tablet  Commonly known as: Levaquin   500 mg, Oral, Daily      oxybutynin XL 10 MG 24 hr tablet  Commonly known as: Ditropan XL   10 mg, Oral, Daily PRN      oxyCODONE 5 MG immediate release tablet  Commonly known as: Roxicodone   5 mg, Oral, Every 6 Hours PRN       phenazopyridine 100 MG tablet  Commonly known as: PYRIDIUM   100 mg, Oral, 3 Times Daily PRN      sulfamethoxazole-trimethoprim 800-160 MG per tablet  Commonly known as: Bactrim DS   160 mg, Oral, 2 Times Daily      tamsulosin 0.4 MG capsule 24 hr capsule  Commonly known as: FLOMAX   0.4 mg, Oral, Nightly         Continue These Medications      Instructions Start Date   aspirin 81 MG tablet   81 mg, Oral, Daily      escitalopram 10 MG tablet  Commonly known as: Lexapro   10 mg, Oral, Daily      loratadine 10 MG tablet  Commonly known as: CLARITIN   10 mg, Oral, Daily      meloxicam 7.5 MG tablet  Commonly known as: Mobic   7.5 mg, Oral, Daily      multivitamin tablet tablet  Commonly known as: THERAGRAN   Oral         Stop These Medications    HYDROcodone-acetaminophen 5-325 MG per tablet  Commonly known as: Norco            Discharge Diet:   cardiac    Activity at Discharge:   bedrest tonight  Otherwise ad jennifer    Follow-up Appointments:    Follow-up Information     Nicola Perry DO Follow up on 11/9/2020.    Specialty: Family Medicine  Why: @ 10-:30 with Rachel Frazier  Contact information:  [address] [place] [id number] [phone]             Carlo Delgado MD Follow up in 3 week(s).    Specialty: Urology  Contact information:  [address] [id number] [phone]                   Future Appointments   Date Time Provider Department Center   11/9/2020 10:30 AM Rachel Frazier APRN [facility]   3/25/2021  9:15 AM Nicola Perry DO [facility]       Test Results Pending at Discharge:    Pending Labs     Order Current Status    STONE ANALYSIS - Calculus, Ureter, Right In process    Urine Culture - Urine, Urine, Clean Catch Preliminary result         Crystal Hillman DO  11/03/20  17:50 EST    Time: I spent 32 minutes on this discharge activity which included: face-to-face encounter with the patient, reviewing the data in the system, coordination of the care  with the nursing staff as well as consultants, documentation, and entering orders.     Dictated utilizing Dragon dictation.

## 2020-11-03 NOTE — DISCHARGE INSTRUCTIONS
Home Care After Ureteroscopy and Laser Lithotripsy  The following instructions will help you care for yourself, or be cared for upon your return home today.    These are guidelines for your care right after surgery only.     Diet  Drink plenty of liquids and eat light meals today.    Start your regular diet tomorrow.    Activity  Start normal activities in twenty-four (24) hours.    Wound Care and Hygiene  No restrictions, start normal routine.    Anesthesia Precautions & Expectations  After anesthesia, rest for 24 hours.    Do not drive, drink alcoholic beverages or make any important decisions during this time.  General anesthesia may cause a sore throat, jaw discomfort or muscle aches.    These symptoms can last for one or two days.     What to Expect after Surgery  Mild pain with voiding.  Frequency or urgency.  Bladder cramps.  Minimal bleeding with voiding.    Call your Doctor  Passing clots in urine preventing bladder emptying  Severe pain not controlled by oral medication  Temperature above 101.5 degrees  Inability to urinate within eight (8) hours after surgery    After Stent Placement  It is common to have blood tinged urine for 3-5 days.  It is common to have pain in your side and in your back when you urinate for 3-5 days.  It is common to have urgency with urination.  This is a temporary stent and will need to be removed in the office in 1 week.    Other Contacts  Urology Office:  [address] [facility] [address]   [place] [id number] [phone] office  [phone] fax    Other Instructions  Take tamsulosin daily until the stent comes out.  Take oxybutynin daily as needed for bladder spasm while the stent is in.  Take Pyridium up to 3 times daily as needed for burning with urination.   Take Tylenol scheduled every 6 hours for the first 3 days.  Take the oxycodone on top of that as needed.  Take all of the antibiotics.     Follow up Appointment  Please call Dr. Delgado's office to schedule a follow-up  appointment in 1 week for stent removal.

## 2020-11-03 NOTE — ANESTHESIA PREPROCEDURE EVALUATION
Anesthesia Evaluation     Patient summary reviewed and Nursing notes reviewed   no history of anesthetic complications:  NPO Solid Status: > 8 hours  NPO Liquid Status: > 8 hours           Airway   Mallampati: II  TM distance: >3 FB  Neck ROM: full  No difficulty expected  Dental - normal exam     Pulmonary - negative pulmonary ROS and normal exam   Cardiovascular - negative cardio ROS and normal exam  Exercise tolerance: unable to assess      ROS comment: NO ECG for review    Neuro/Psych  (+) headaches, psychiatric history Anxiety,     GI/Hepatic/Renal/Endo    (+)  GERD,  renal disease stones,     Musculoskeletal     Abdominal  - normal exam   Substance History - negative use     OB/GYN negative ob/gyn ROS         Other   arthritis,      ROS/Med Hx Other: Generalized anxiety disorder  Multiple dysplastic nevi  Screen for colon cancer  Diverticulosis of sigmoid colon  Polyp of colon  Pes anserine bursitis  Ureteral stone with hydronephrosis  Acute abdominal pain in right flank  Obstruction of right ureter                      Anesthesia Plan    ASA 2     general   (Risks and benefits of general anesthesia discussed with patient, including, aspiration, recall, dental damage, cardiac or respiratory compromise, stroke, fluctuations in blood pressure, seizure or death.     Pt advised that a endotracheal tube (ETT), laryngeal mask airway (LMA) or mask would be utilized to maintain the airway. Pt verbalized understanding and agreed to plan.)  intravenous induction     Anesthetic plan, all risks, benefits, and alternatives have been provided, discussed and informed consent has been obtained with: patient.    Plan discussed with CRNA.

## 2020-11-03 NOTE — OP NOTE
Preoperative diagnosis  right ureteral stone    Postoperative diagnosis  right ureteral stone    Procedure performed  1.  Flexible cystoscopy  2.  right retrograde pyelogram  3.  right ureteroscopy with holmium-YAG laser lithotripsy and basket extraction of stone fragments (45084)  4.  right ureteral stent placement 6 Fr x 26 cm  5.  Fluoroscopy time < 1 hour    Surgeon  Carlo Delgado MD    Anesthesia  General    Complications  None    Specimen  Stone fragments for biochemical analysis    Findings  Cystoscopy revealed no tumors, stones or other mucosal abnormalities.  Retrograde pyelogram revealed a delicate system with identification of the stones seen on imaging.    Ureteroscopy revealed a large right ureteral stone and multiple right renal stones.     Indications  55 y.o. male with a history of an obstructing 8 mm right ureteral stone and multiple small right renal stones agreed to undergo the above named procedure after discussion of the alternatives, risks and benefits. Informed consent was obtained.      Procedure  The patient was taken to the operating room and placed supine on the operating table.  Pre-operative antibiotics were administered.  Bilateral lower extremity SCDs were placed.  After induction of general anesthesia the patient was positioned in dorsal lithotomy, prepped and draped in a sterile fashion.  A time-out was performed.      A 14 Libyan flexible cystoscope was passed carefully via urethra into the bladder.  The right ureteral orifice was identified and a Sensor wire was passed retrograde to the level of the kidney and confirmed by fluoroscopy.  The flexible scope was off-loaded and the bladder emptied with a straight catheter. The semirigid ureteroscope was passed carefully along the Sensor wire through the urethra and into the distal ureter.  The stone was just above the iliac vessels so we are not able to traverse to get it with the semirigid ureteroscope.  I therefore placed a Super  Stiff wire and then 11/13, 28 cm access sheath.  We then entered the ureter with the flexible ureteroscope the stone was encountered and fragmented with a 200-micron holmium YAG laser fiber at laser settings of 0.2 Joules and a frequency of 50 Hz.  The fragments were basket extracted.  We then drove further up into the kidney with the ureteroscope and dusted all of the stones within the kidney.  At the completion of the procedure, all clinically significant fragments were removed from the ureter and only dust-like fragments remained.  The ureteroscope was withdrawn.  A 5-Slovenian open-ended was passed over the wire and the wire removed.  A retrograde pyelogram was performed by slowly injecting 5 mL of 50% Omnipaque contrast via the 5 Slovenian catheter with findings described above.  An Amplatz super-stiff was placed to the level of the renal pelvis confirmed by fluoroscopy.  A 6 Fr x 26 cm stent was positioned with the upper end in the upper pole and the lower in the bladder confirmed by fluoroscopy. The bladder was emptied and the procedure was complete. The patient tolerated the procedure well and was stable throughout.    The patient will follow up with me next week for stent removal.  He can be discharged later today if he is feeling well.

## 2020-11-03 NOTE — PLAN OF CARE
Goal Outcome Evaluation:  Plan of Care Reviewed With: patient  Progress: no change   VSS. Pain controlled per MAR. NPO since midnight. Plan for lithotripsy today. Will continue to monitor.

## 2020-11-04 ENCOUNTER — TELEPHONE (OUTPATIENT)
Dept: UROLOGY | Facility: CLINIC | Age: 55
End: 2020-11-04

## 2020-11-04 ENCOUNTER — TRANSITIONAL CARE MANAGEMENT TELEPHONE ENCOUNTER (OUTPATIENT)
Dept: CALL CENTER | Facility: HOSPITAL | Age: 55
End: 2020-11-04

## 2020-11-04 NOTE — OUTREACH NOTE
Prep Survey      Responses   [facility] patient discharged from?  [facility]   Is LACE score < 7 ?  Yes   Eligibility  [facility]   Date of Admission  11/02/20   Date of Discharge  11/03/20   Discharge Disposition  Home or Self Care   Discharge diagnosis  Acute ureterolithiasis with hydronephrosis,     Ureteral stone,    URETEROSCOPY LASER LITHOTRIPSY WITH STENT INSERTION   Does the patient have one of the following disease processes/diagnoses(primary or secondary)?  Other   Does the patient have Home health ordered?  No   Is there a DME ordered?  No   Prep survey completed?  Yes          Oliva Douglas RN

## 2020-11-04 NOTE — OUTREACH NOTE
Call Center TCM Note      Responses   [facility] patient discharged from?  [facility]   Does the patient have one of the following disease processes/diagnoses(primary or secondary)?  Other   TCM attempt successful?  Yes   Call start time  0946   Call end time  0948   Discharge diagnosis  Acute ureterolithiasis with hydronephrosis,     Ureteral stone,    URETEROSCOPY LASER LITHOTRIPSY WITH STENT INSERTION   Meds reviewed with patient/caregiver?  Yes   Is the patient having any side effects they believe may be caused by any medication additions or changes?  No   Does the patient have all medications ordered at discharge?  Yes   Is the patient taking all medications as directed (includes completed medication regime)?  Yes   Does the patient have a primary care provider?   Yes   Does the patient have an appointment with their PCP within 7 days of discharge?  Yes   Comments regarding PCP  Hospital d/c f/u appt is on 11/9/20 at 10:30 am    Has the patient kept scheduled appointments due by today?  N/A   Psychosocial issues?  No   Did the patient receive a copy of their discharge instructions?  Yes   Nursing interventions  Reviewed instructions with patient   What is the patient's perception of their health status since discharge?  Improving   Is the patient/caregiver able to teach back signs and symptoms related to disease process for when to call PCP?  Yes   Is the patient/caregiver able to teach back signs and symptoms related to disease process for when to call 911?  Yes   Is the patient/caregiver able to teach back the hierarchy of who to call/visit for symptoms/problems? PCP, Specialist, Home health nurse, Urgent Care, ED, 911  Yes   If the patient is a current smoker, are they able to teach back resources for cessation?  Not a smoker   TCM call completed?  Yes          Marlyn Lipscomb RN    11/4/2020, 09:48 EST

## 2020-11-04 NOTE — OUTREACH NOTE
Call Center TCM Note      Responses   [facility] patient discharged from?  [facility]   Does the patient have one of the following disease processes/diagnoses(primary or secondary)?  Other   TCM attempt successful?  No   Unsuccessful attempts  Attempt 1 [Pt and Kassandra]          Marlyn Lipscomb RN    11/4/2020, 09:28 EST

## 2020-11-04 NOTE — TELEPHONE ENCOUNTER
Called pt to follow up from sx. No issues or concerns at this time but pt did not have follow up appt. Scheduled this stent removal for next week.

## 2020-11-04 NOTE — PROGRESS NOTES
Case Management Discharge Note           Provided Post Acute Provider List?: N/A  N/A Provider List Comment: No DME, HH or Skilled Nursing needed  Provided Post Acute Provider Quality & Resource List?: N/A  N/A Quality & Resource List Comment: No Needs    Selected Continued Care - Discharged on 11/3/2020 Admission date: 11/2/2020 - Discharge disposition: Home or Self Care    Destination    No services have been selected for the patient.              Durable Medical Equipment    No services have been selected for the patient.              Dialysis/Infusion    No services have been selected for the patient.              Home Medical Care    No services have been selected for the patient.              Therapy    No services have been selected for the patient.              Community Resources    No services have been selected for the patient.                       Final Discharge Disposition Code: 01 - home or self-care

## 2020-11-09 ENCOUNTER — OFFICE VISIT (OUTPATIENT)
Dept: FAMILY MEDICINE CLINIC | Facility: CLINIC | Age: 55
End: 2020-11-09

## 2020-11-09 VITALS
HEIGHT: 69 IN | DIASTOLIC BLOOD PRESSURE: 80 MMHG | OXYGEN SATURATION: 98 % | WEIGHT: 174 LBS | TEMPERATURE: 97.7 F | BODY MASS INDEX: 25.77 KG/M2 | HEART RATE: 78 BPM | SYSTOLIC BLOOD PRESSURE: 132 MMHG

## 2020-11-09 DIAGNOSIS — N13.30 HYDRONEPHROSIS OF RIGHT KIDNEY: ICD-10-CM

## 2020-11-09 DIAGNOSIS — N17.9 AKI (ACUTE KIDNEY INJURY) (HCC): Primary | ICD-10-CM

## 2020-11-09 DIAGNOSIS — N20.0 KIDNEY STONE: ICD-10-CM

## 2020-11-09 PROCEDURE — 99495 TRANSJ CARE MGMT MOD F2F 14D: CPT | Performed by: NURSE PRACTITIONER

## 2020-11-09 NOTE — PROGRESS NOTES
Transitional Care Follow Up Visit  Subjective     Carl Moraes is a 55 y.o. male who presents for a transitional care management visit.    Within 48 business hours after discharge our office contacted him via telephone to coordinate his care and needs.      I reviewed and discussed the details of that call along with the discharge summary, hospital problems, inpatient lab results, inpatient diagnostic studies, and consultation reports with Carl.     Current outpatient and discharge medications have been reconciled for the patient.  Reviewed by: DANILO Reilly      Date of TCM Phone Call 11/3/2020   [facility]   Date of Admission 11/2/2020   Date of Discharge 11/3/2020   Discharge Disposition Home or Self Care     Risk for Readmission (LACE) No data recorded      History of Present Illness   Notes cramps in his right flank last night.   Was constipated. Had BM on Friday.   Also have periumbicall pain that radiates to his groin.   Feels like his pain is some better but still present.   Notes he is taking tylenol for pain. Was taking oxycodone but does not want or need that. Takes mobic for pain.   Notes he feels more fatigued and some what tired and winding when he walks to his barn.       Course During Hospital Stay:  55 M history of nephrolithiasis who presented to his PCPs office with right flank pain.  CT imaging revealed a 6 mm stone with hydronephrosis.  He was admitted to the hospital and underwent urologic intervention with Dr. Delgado.  He was examined and medically stable for discharge home.  Patient is given IV fluids, antiemetics, and provided with pain control. He was given Flomax, pain meds, and antibiotics by Dr. Delgado who he is to f/u with in 1 week.     The following portions of the patient's history were reviewed and updated as appropriate: allergies, current medications, past family history, past medical history, past social history, past surgical history and problem  list.    Review of Systems  Gen- No fevers, chills  CV- No chest pain, palpitations  Resp- No cough, dyspnea    Objective   Physical Exam  Vitals signs and nursing note reviewed.   Constitutional:       Appearance: He is well-developed.   HENT:      Head: Normocephalic and atraumatic.   Eyes:      Pupils: Pupils are equal, round, and reactive to light.   Neck:      Musculoskeletal: Neck supple.   Cardiovascular:      Rate and Rhythm: Normal rate and regular rhythm.      Heart sounds: Normal heart sounds.   Pulmonary:      Effort: Pulmonary effort is normal.      Breath sounds: Normal breath sounds.   Abdominal:      General: Bowel sounds are normal. There is no distension.      Palpations: Abdomen is soft.      Tenderness: There is no abdominal tenderness.      Comments: Mild flank pain on right   Skin:     General: Skin is warm and dry.   Neurological:      General: No focal deficit present.      Mental Status: He is alert and oriented to person, place, and time.   Psychiatric:         Mood and Affect: Mood normal.         Behavior: Behavior normal.         Assessment/Plan   Diagnoses and all orders for this visit:    1. YESI (acute kidney injury) (CMS/[facility]) (Primary)  -     Cancel: CBC Auto Differential  -     Basic Metabolic Panel    2. Hydronephrosis of right kidney  -     Cancel: CBC Auto Differential  -     Basic Metabolic Panel    3. Kidney stone  -     Cancel: CBC Auto Differential  -     Basic Metabolic Panel    Other orders  -     CBC & Differential      -- labs, continue meds, push fluids, keep f/u with urology

## 2020-11-10 LAB
BASOPHILS # BLD AUTO: 0.04 10*3/MM3 (ref 0–0.2)
BASOPHILS NFR BLD AUTO: 1.3 % (ref 0–1.5)
BUN SERPL-MCNC: 20 MG/DL (ref 6–20)
BUN/CREAT SERPL: 13.4 (ref 7–25)
CALCIUM SERPL-MCNC: 9.1 MG/DL (ref 8.6–10.5)
CHLORIDE SERPL-SCNC: 104 MMOL/L (ref 98–107)
CO2 SERPL-SCNC: 24.7 MMOL/L (ref 22–29)
CREAT SERPL-MCNC: 1.49 MG/DL (ref 0.76–1.27)
EOSINOPHIL # BLD AUTO: 0.11 10*3/MM3 (ref 0–0.4)
EOSINOPHIL NFR BLD AUTO: 3.6 % (ref 0.3–6.2)
ERYTHROCYTE [DISTWIDTH] IN BLOOD BY AUTOMATED COUNT: 12.7 % (ref 12.3–15.4)
GLUCOSE SERPL-MCNC: 98 MG/DL (ref 65–99)
HCT VFR BLD AUTO: 45.5 % (ref 37.5–51)
HGB BLD-MCNC: 15.9 G/DL (ref 13–17.7)
IMM GRANULOCYTES # BLD AUTO: 0.01 10*3/MM3 (ref 0–0.05)
IMM GRANULOCYTES NFR BLD AUTO: 0.3 % (ref 0–0.5)
LYMPHOCYTES # BLD AUTO: 1.15 10*3/MM3 (ref 0.7–3.1)
LYMPHOCYTES NFR BLD AUTO: 38.1 % (ref 19.6–45.3)
MCH RBC QN AUTO: 30.3 PG (ref 26.6–33)
MCHC RBC AUTO-ENTMCNC: 34.9 G/DL (ref 31.5–35.7)
MCV RBC AUTO: 86.8 FL (ref 79–97)
MONOCYTES # BLD AUTO: 0.36 10*3/MM3 (ref 0.1–0.9)
MONOCYTES NFR BLD AUTO: 11.9 % (ref 5–12)
NEUTROPHILS # BLD AUTO: 1.35 10*3/MM3 (ref 1.7–7)
NEUTROPHILS NFR BLD AUTO: 44.8 % (ref 42.7–76)
NRBC BLD AUTO-RTO: 0 /100 WBC (ref 0–0.2)
PLATELET # BLD AUTO: 188 10*3/MM3 (ref 140–450)
POTASSIUM SERPL-SCNC: 4.9 MMOL/L (ref 3.5–5.2)
RBC # BLD AUTO: 5.24 10*6/MM3 (ref 4.14–5.8)
SODIUM SERPL-SCNC: 137 MMOL/L (ref 136–145)
WBC # BLD AUTO: 3.02 10*3/MM3 (ref 3.4–10.8)

## 2020-11-10 NOTE — PROGRESS NOTES
Please let patient know his kidney numbers have bumped up again but are stable. Creatinine is 1.5. Continue to push fluids. No motrin. Stop azo. . WBC is a little low, likely due to recently infection. Keep f/u with Dr. Delgado on Friday

## 2020-11-12 ENCOUNTER — PROCEDURE VISIT (OUTPATIENT)
Dept: UROLOGY | Facility: CLINIC | Age: 55
End: 2020-11-12

## 2020-11-12 VITALS — RESPIRATION RATE: 18 BRPM | HEIGHT: 69 IN | TEMPERATURE: 98.2 F | WEIGHT: 174 LBS | BODY MASS INDEX: 25.77 KG/M2

## 2020-11-12 DIAGNOSIS — N13.2 URETERAL STONE WITH HYDRONEPHROSIS: Primary | ICD-10-CM

## 2020-11-12 LAB
COLOR STONE: NORMAL
COM MFR STONE: 100 %
COMPN STONE: NORMAL
LABORATORY COMMENT REPORT: NORMAL
Lab: NORMAL
Lab: NORMAL
PHOTO: NORMAL
SIZE STONE: NORMAL MM
SPEC SOURCE SUBJ: NORMAL
WT STONE: 10 MG

## 2020-11-12 PROCEDURE — 52310 CYSTOSCOPY AND TREATMENT: CPT | Performed by: UROLOGY

## 2020-11-12 NOTE — PROGRESS NOTES
Preoperative diagnosis  Foreign body in genitourinary tract    Postoperative diagnosis  Foreign body in genitourinary tract    Procedure  Flexible cystourethroscopy with stent removal    Attending surgeon  Carlo Delgado MD    Anesthesia  2% lidocaine jelly intraurethrally    Complications  None    Indications  55 y.o. male who is status post right ureteroscopy with holmium laser lithotripsy who presents for stent removal.    Procedure  Detailed information of all possible complications and side effects were discussed with the patient.  Informed consent was obtained. Patient was given one dose of antibiotics. The patient was placed in supine position and a timeout was performed. The patient was prepped and draped in sterile fashion.  Next, 2% lidocaine jelly was bluntly injected per urethra without difficulty. The 14 Lao flexible cystoscope was passed through the urethra and into the bladder.  The stent was visualized, grasped and removed in its entirety.  The patient tolerated the procedure well.    Plan  1. Provided education regarding water intake of at least 2 liters per day  2. F/u in 8 weeks with a renal ultrasound  3.  Litholink

## 2020-12-28 RX ORDER — PANTOPRAZOLE SODIUM 40 MG/1
40 TABLET, DELAYED RELEASE ORAL DAILY
Qty: 90 TABLET | Refills: 3 | Status: SHIPPED | OUTPATIENT
Start: 2020-12-28 | End: 2021-03-09 | Stop reason: SDUPTHER

## 2021-01-14 ENCOUNTER — HOSPITAL ENCOUNTER (OUTPATIENT)
Dept: ULTRASOUND IMAGING | Facility: HOSPITAL | Age: 56
End: 2021-01-14

## 2021-01-28 ENCOUNTER — HOSPITAL ENCOUNTER (OUTPATIENT)
Dept: ULTRASOUND IMAGING | Facility: HOSPITAL | Age: 56
Discharge: HOME | End: 2021-01-28
Admitting: UROLOGY

## 2021-01-28 ENCOUNTER — OFFICE VISIT (OUTPATIENT)
Dept: UROLOGY | Facility: CLINIC | Age: 56
End: 2021-01-28

## 2021-01-28 VITALS
TEMPERATURE: 97.4 F | DIASTOLIC BLOOD PRESSURE: 68 MMHG | OXYGEN SATURATION: 97 % | BODY MASS INDEX: 25.77 KG/M2 | WEIGHT: 174 LBS | SYSTOLIC BLOOD PRESSURE: 110 MMHG | HEART RATE: 60 BPM | RESPIRATION RATE: 20 BRPM | HEIGHT: 69 IN

## 2021-01-28 DIAGNOSIS — N20.0 NEPHROLITHIASIS: Primary | ICD-10-CM

## 2021-01-28 DIAGNOSIS — N13.2 URETERAL STONE WITH HYDRONEPHROSIS: ICD-10-CM

## 2021-01-28 PROCEDURE — 99214 OFFICE O/P EST MOD 30 MIN: CPT | Performed by: UROLOGY

## 2021-01-28 PROCEDURE — 76775 US EXAM ABDO BACK WALL LIM: CPT

## 2021-01-28 RX ORDER — CHLORTHALIDONE 25 MG/1
25 TABLET ORAL DAILY
Qty: 30 TABLET | Refills: 11 | Status: SHIPPED | OUTPATIENT
Start: 2021-01-28 | End: 2021-02-18

## 2021-01-28 NOTE — PROGRESS NOTES
"Chief Complaint  History of kidney stones    HPI  Mr. Moraes is a 56 y.o. male who presents for follow up.  He is s/p ureteroscopy and laser lithotripsy on 11/3/2020.  He is doing well.  No fever, chills, nausea, vomiting, hematuria, or flank pain.    Stone prevention medications: None    He completed a 24-hour urine collection with results below    Review of Systems  Constitutional: No fevers or chills  Endocrine: No heat/cold intolerance   Cardiovascular: Negative for chest pain or dyspnea on exertion  Respiratory: Negative for shortness of breath or wheezing  Gastrointestinal: Negative for constipation, nausea or vomiting  Genitourinary: per HPI  Lymph/Heme: Negative for easily bruises / bleeds    Physical Exam  Visit Vitals  /68   Pulse 60   Temp 97.4 °F (36.3 °C)   Resp 20   Ht 175.3 cm (69.02\")   Wt 78.9 kg (174 lb)   SpO2 97%   BMI 25.68 kg/m²     Constitutional: NAD, WDWN.   Back: No CVA tenderness.  Extremities: LORENA x 4, Warm. No clubbing.  No cyanosis.    Skin: Pink, warm and dry.  No rashes noted.    Urinalysis  @LASTLABOSUSHORT(APPEARANCE,COLOR,PH,SPECIFICGRAV,PROTEIN,KETONES,GLUCOSE,BILIRUBIN,UROBILINOGEN,BLOOD,NITRITE,LEUKOCYTE)@      Labs  @LASTLABOSUSHORT(PTH,CALCIUM,CALCIUMPTH,CALCIUMIONIZ,PHOSPHORUS)@  @LASTLABOSUSHORT(URIC)@    Stone Analysis  No components found for: [id number], [id number], [id number]  100% calcium oxalate monohydrate      Radiographic Studies  Us Renal Bilateral    Result Date: 1/28/2021  Morphologically normal kidneys bilaterally.      This report was finalized on 1/28/2021 11:25 AM by Ryanne Blanchard MD.    Ct Abdomen Pelvis Stone Protocol    Result Date: 11/2/2020  6 mm stone in the distal right ureter producing moderate hydronephrosis.     672.84 mGy.cm. 12.65 mGy  This study was performed with techniques to keep radiation doses as low as reasonably achievable (ALARA). Individualized dose reduction techniques using automated exposure control or adjustment of mA " and/or kV according to the patient size were employed.  This report was finalized on 11/2/2020 12:36 PM by Giovanni Mcfadden M.D..  .    Assessment  Mr. Moraes is a 56 y.o. male with history of right ureteral stone, status post right ureteroscopy with laser lithotripsy.  He has a longstanding history of recurrent kidney stones.  Stone composition is 100% calcium oxalate monohydrate.  We reviewed his Litholink.  His major risk factor is hypercalciuria and low urine volumes.    We had a long conversation about management of hypercalciuria with thiazide diuretics.  We reviewed the risk, benefits, and alternatives.  He does have a history of Tums usage, but not an excessive amount.  He has been having the stones long before he started using antacids and is on a PPI now.    Plan  1. Fluid intake goal ~ 2.5L per day.  2.  Chlorthalidone 25 mg p.o. daily.  Follow-up in 2 weeks with BMP to make sure he does not have hypokalemia or hypotension.    No follow-ups on file.    Carlo Delgado MD

## 2021-02-09 ENCOUNTER — TELEPHONE (OUTPATIENT)
Dept: FAMILY MEDICINE CLINIC | Facility: CLINIC | Age: 56
End: 2021-02-09

## 2021-02-09 ENCOUNTER — LAB (OUTPATIENT)
Dept: FAMILY MEDICINE CLINIC | Facility: CLINIC | Age: 56
End: 2021-02-09

## 2021-02-09 DIAGNOSIS — R82.994 HYPERCALCIURIA: Primary | ICD-10-CM

## 2021-02-09 DIAGNOSIS — R82.994 HYPERCALCIURIA: ICD-10-CM

## 2021-02-09 NOTE — TELEPHONE ENCOUNTER
Can you please order this for  Dimitry.  He has an order form Dr Oliveros, but it can't be drawn here unless its ordered by one of our providers.

## 2021-02-10 LAB
BUN SERPL-MCNC: 21 MG/DL (ref 6–20)
BUN/CREAT SERPL: 18.6 (ref 7–25)
CALCIUM SERPL-MCNC: 9.4 MG/DL (ref 8.6–10.5)
CHLORIDE SERPL-SCNC: 101 MMOL/L (ref 98–107)
CO2 SERPL-SCNC: 27.5 MMOL/L (ref 22–29)
CREAT SERPL-MCNC: 1.13 MG/DL (ref 0.76–1.27)
GLUCOSE SERPL-MCNC: 97 MG/DL (ref 65–99)
POTASSIUM SERPL-SCNC: 3.9 MMOL/L (ref 3.5–5.2)
SODIUM SERPL-SCNC: 139 MMOL/L (ref 136–145)

## 2021-02-18 ENCOUNTER — OFFICE VISIT (OUTPATIENT)
Dept: UROLOGY | Facility: CLINIC | Age: 56
End: 2021-02-18

## 2021-02-18 DIAGNOSIS — N20.0 NEPHROLITHIASIS: Primary | ICD-10-CM

## 2021-02-18 PROCEDURE — 99442 PR PHYS/QHP TELEPHONE EVALUATION 11-20 MIN: CPT | Performed by: UROLOGY

## 2021-02-18 NOTE — PROGRESS NOTES
Chief Complaint  History of kidney stones    HPI  Mr. Moraes is a 56 y.o. male who presents for follow up.  He is s/p ureteroscopy and laser lithotripsy on 11/3/2020.  He is doing well.  No fever, chills, nausea, vomiting, hematuria, or flank pain.    Stone prevention medications: He has been taking chlorthalidone.  He says it makes him feel offkilter and gives him a lightheaded.  He did not take his blood pressure while on it because he said his blood pressure cuff is broken.  His potassium was normal.    Review of Systems  Constitutional: No fevers or chills  Endocrine: No heat/cold intolerance   Cardiovascular: Negative for chest pain or dyspnea on exertion  Respiratory: Negative for shortness of breath or wheezing  Gastrointestinal: Negative for constipation, nausea or vomiting  Genitourinary: per HPI  Lymph/Heme: Negative for easily bruises / bleeds    Physical Exam  There were no vitals taken for this visit.  Constitutional: NAD, WDWN.   Back: No CVA tenderness.  Extremities: LORENA x 4, Warm. No clubbing.  No cyanosis.    Skin: Pink, warm and dry.  No rashes noted.    Urinalysis  @LASTLABOSUSHORT(APPEARANCE,COLOR,PH,SPECIFICGRAV,PROTEIN,KETONES,GLUCOSE,BILIRUBIN,UROBILINOGEN,BLOOD,NITRITE,LEUKOCYTE)@      Labs  @LASTLABOSUSHORT(PTH,CALCIUM,CALCIUMPTH,CALCIUMIONIZ,PHOSPHORUS)@  @LASTLABOSUSHORT(URIC)@    Stone Analysis  No components found for: [id number], [id number], [id number]  100% calcium oxalate monohydrate      Radiographic Studies  Us Renal Bilateral    Result Date: 1/28/2021  Morphologically normal kidneys bilaterally.      This report was finalized on 1/28/2021 11:25 AM by Ryanne Blanchard MD.    Ct Abdomen Pelvis Stone Protocol    Result Date: 11/2/2020  6 mm stone in the distal right ureter producing moderate hydronephrosis.     672.84 mGy.cm. 12.65 mGy  This study was performed with techniques to keep radiation doses as low as reasonably achievable (ALARA). Individualized dose reduction techniques  using automated exposure control or adjustment of mA and/or kV according to the patient size were employed.  This report was finalized on 11/2/2020 12:36 PM by Giovanni Mcfadden M.D..  .    Assessment  Mr. Moraes is a 56 y.o. male with history of right ureteral stone, status post right ureteroscopy with laser lithotripsy.  He has a longstanding history of recurrent kidney stones.  Stone composition is 100% calcium oxalate monohydrate.  We reviewed his Litholink.  His major risk factor is hypercalciuria and low urine volumes.    We had a long conversation about management of hypercalciuria with thiazide diuretics.  He tried taking this medication for 3 weeks and has felt lightheaded and rundown while on it.  We therefore mutually decided to stop it.  We will focus on fluid and salt intake for now.    Plan  1.  Fluid intake goal ~ 2.5L per day.  2.  Stop Chlorthalidone 25 mg p.o. daily.    3.  KUB in 6 mo    This visit has been rescheduled as a phone visit to comply with patient safety concerns in accordance with CDC recommendations. Total time of discussion was 11 minutes.

## 2021-02-19 DIAGNOSIS — M25.562 ARTHRALGIA OF BOTH KNEES: ICD-10-CM

## 2021-02-19 DIAGNOSIS — F41.1 GENERALIZED ANXIETY DISORDER: ICD-10-CM

## 2021-02-19 DIAGNOSIS — M25.561 ARTHRALGIA OF BOTH KNEES: ICD-10-CM

## 2021-02-19 RX ORDER — LORATADINE 10 MG/1
10 TABLET ORAL DAILY
Qty: 90 TABLET | Refills: 3 | Status: SHIPPED | OUTPATIENT
Start: 2021-02-19 | End: 2021-02-19 | Stop reason: SDUPTHER

## 2021-02-19 RX ORDER — ESCITALOPRAM OXALATE 10 MG/1
10 TABLET ORAL DAILY
Qty: 90 TABLET | Refills: 3 | Status: SHIPPED | OUTPATIENT
Start: 2021-02-19 | End: 2021-02-19 | Stop reason: SDUPTHER

## 2021-02-19 RX ORDER — MELOXICAM 7.5 MG/1
7.5 TABLET ORAL DAILY
Qty: 90 TABLET | Refills: 3 | Status: SHIPPED | OUTPATIENT
Start: 2021-02-19 | End: 2021-03-09 | Stop reason: SDUPTHER

## 2021-02-19 RX ORDER — LORATADINE 10 MG/1
10 TABLET ORAL DAILY
Qty: 90 TABLET | Refills: 3 | Status: SHIPPED | OUTPATIENT
Start: 2021-02-19 | End: 2022-03-15

## 2021-02-19 RX ORDER — ESCITALOPRAM OXALATE 10 MG/1
10 TABLET ORAL DAILY
Qty: 90 TABLET | Refills: 3 | Status: SHIPPED | OUTPATIENT
Start: 2021-02-19 | End: 2021-03-09 | Stop reason: SDUPTHER

## 2021-02-19 RX ORDER — MELOXICAM 7.5 MG/1
7.5 TABLET ORAL DAILY
Qty: 90 TABLET | Refills: 3 | Status: SHIPPED | OUTPATIENT
Start: 2021-02-19 | End: 2021-02-19 | Stop reason: SDUPTHER

## 2021-03-09 ENCOUNTER — OFFICE VISIT (OUTPATIENT)
Dept: FAMILY MEDICINE CLINIC | Facility: CLINIC | Age: 56
End: 2021-03-09

## 2021-03-09 VITALS
HEART RATE: 69 BPM | DIASTOLIC BLOOD PRESSURE: 70 MMHG | SYSTOLIC BLOOD PRESSURE: 120 MMHG | HEIGHT: 69 IN | BODY MASS INDEX: 25.33 KG/M2 | TEMPERATURE: 97.5 F | OXYGEN SATURATION: 98 % | WEIGHT: 171 LBS

## 2021-03-09 DIAGNOSIS — E78.5 DYSLIPIDEMIA: ICD-10-CM

## 2021-03-09 DIAGNOSIS — M25.562 ARTHRALGIA OF BOTH KNEES: ICD-10-CM

## 2021-03-09 DIAGNOSIS — M25.561 ARTHRALGIA OF BOTH KNEES: ICD-10-CM

## 2021-03-09 DIAGNOSIS — Z00.00 ROUTINE GENERAL MEDICAL EXAMINATION AT A HEALTH CARE FACILITY: Primary | ICD-10-CM

## 2021-03-09 DIAGNOSIS — F41.1 GENERALIZED ANXIETY DISORDER: ICD-10-CM

## 2021-03-09 PROBLEM — R10.9 ACUTE ABDOMINAL PAIN IN RIGHT FLANK: Status: RESOLVED | Noted: 2020-11-02 | Resolved: 2021-03-09

## 2021-03-09 PROCEDURE — 99396 PREV VISIT EST AGE 40-64: CPT | Performed by: FAMILY MEDICINE

## 2021-03-09 RX ORDER — PANTOPRAZOLE SODIUM 40 MG/1
40 TABLET, DELAYED RELEASE ORAL DAILY
Qty: 90 TABLET | Refills: 3 | Status: SHIPPED | OUTPATIENT
Start: 2021-03-09 | End: 2022-03-23 | Stop reason: SDUPTHER

## 2021-03-09 RX ORDER — ESCITALOPRAM OXALATE 10 MG/1
10 TABLET ORAL DAILY
Qty: 90 TABLET | Refills: 3 | Status: SHIPPED | OUTPATIENT
Start: 2021-03-09 | End: 2021-08-06

## 2021-03-09 RX ORDER — MELOXICAM 7.5 MG/1
7.5 TABLET ORAL DAILY
Qty: 90 TABLET | Refills: 3 | Status: SHIPPED | OUTPATIENT
Start: 2021-03-09 | End: 2022-06-14 | Stop reason: SDUPTHER

## 2021-03-09 NOTE — PROGRESS NOTES
Established Patient        Chief Complaint:   Chief Complaint   Patient presents with   • Annual Exam        Carl Moraes is a 56 y.o. male    History of Present Illness:   Here today for scheduled annual/preventative healthcare visit.    Patient reports undergoing ureterolithiasis last fall, this required intervention by Dr. Delgado, urology, without any complications since that time.  He denies any hematuria, no reports of fever, chills or night sweats.  Maintains an active lifestyle and balanced dietary intake.  He has had a few episodes of ureteral colic, none sustained.  He denies any bright red blood or black tarry stools.  Denies any problems with his current medication regimen.  No aspiration or dysphagia.  No BRB/BTS.    Subjective     The following portions of the patient's history were reviewed and updated as appropriate: allergies, current medications, past family history, past medical history, past social history, past surgical history and problem list.    No Known Allergies    Review of Systems  1. Constitutional: Negative for fever. Negative for chills, diaphoresis, fatigue and unexpected weight change.   2. HENT: No dysphagia; no changes to vision/hearing/smell/taste; no epistaxis  3. Eyes: Negative for redness and visual disturbance.   4. Respiratory: negative for shortness of breath. Negative for chest pain . Negative for cough and chest tightness.   5. Cardiovascular: Negative for chest pain and palpitations.   6. Gastrointestinal: Negative for abdominal distention, abdominal pain and blood in stool.   7. Endocrine: Negative for cold intolerance and heat intolerance.   8. Genitourinary: Negative for difficulty urinating, dysuria and frequency.   9. Musculoskeletal: Negative for arthralgias, back pain and myalgias.   10. Skin: Negative for color change, rash and wound.   11. Neurological: Negative for syncope, weakness and headaches.   12. Hematological: Negative for adenopathy. Does not  "bruise/bleed easily.   13. Psychiatric/Behavioral: Negative for confusion. The patient is not nervous/anxious.    Objective     Physical Exam   Vital Signs: /70   Pulse 69   Temp 97.5 °F (36.4 °C)   Ht 175.3 cm (69\")   Wt 77.6 kg (171 lb)   SpO2 98%   BMI 25.25 kg/m²     General Appearance: alert, oriented x 3, no acute distress.  Skin: warm and dry.   HEENT: Atraumatic.  pupils round and reactive to light and accommodation, oral mucosa pink and moist.  Nares patent without epistaxis.  External auditory canals are patent tympanic membranes intact.  Neck: supple, no JVD, trachea midline.  No thyromegaly  Lungs: CTA, unlabored breathing effort.  Heart: RRR, normal S1 and S2, no S3, no rub.  Abdomen: soft, non-tender, no palpable bladder, present bowel sounds to auscultation ×4.  No guarding or rigidity.  Extremities: no clubbing, cyanosis or edema.  Good range of motion actively and passively.  Symmetric muscle strength and development  Neuro: normal speech and mental status.  Cranial nerves II through XII intact.  No anosmia. DTR 2+; proprioception intact.  No focal motor/sensory deficits.    Assessment and Plan      Assessment:   Diagnoses and all orders for this visit:    1. Routine general medical examination at a health care facility (Primary)  -     Comprehensive metabolic panel  -     Lipid panel    2. Generalized anxiety disorder  -     escitalopram (Lexapro) 10 MG tablet; Take 1 tablet by mouth Daily.  Dispense: 90 tablet; Refill: 3    3. Arthralgia of both knees  -     meloxicam (Mobic) 7.5 MG tablet; Take 1 tablet by mouth Daily.  Dispense: 90 tablet; Refill: 3    4. Dyslipidemia  -     Comprehensive metabolic panel  -     Lipid panel    Other orders  -     pantoprazole (Protonix) 40 MG EC tablet; Take 1 tablet by mouth Daily.  Dispense: 90 tablet; Refill: 3        Plan:  Surveillance labs today ordered including CMP/lipid panel.  Most recent laboratory evaluation reviewed additionally.  Patient " will return to clinic when he is fasting for these.    I have refilled patient's mood stabilizer regimen.  Continue current dosing.  Continue cognitive behavioral therapy additionally.    Continue daily meloxicam use.  Still continues to benefit from therapeutic effects.    Continue PPI therapy, as well as dietary/lifestyle modifications.    Discussion Summary:    I spent 30 minutes caring for Carl on this date of service. This time includes time spent by me in the following activities:preparing for the visit, reviewing tests, performing a medically appropriate examination and/or evaluation , counseling and educating the patient/family/caregiver, ordering medications, tests, or procedures, documenting information in the medical record and care coordination     I have discussed age/gender specific preventative healthcare issues in detail with patient today.  I have answered all of the questions.    Discussed plan of care in detail with pt today; pt verb understanding and agrees.  Follow up:  No follow-ups on file.     There are no Patient Instructions on file for this visit.    Nicola Perry,   03/09/21  17:51 EST          Please note that portions of this note may have been completed with a voice recognition program. Efforts were made to edit the dictations, but occasionally words are mistranscribed.

## 2021-03-12 ENCOUNTER — LAB (OUTPATIENT)
Dept: FAMILY MEDICINE CLINIC | Facility: CLINIC | Age: 56
End: 2021-03-12

## 2021-03-13 LAB
ALBUMIN SERPL-MCNC: 4.4 G/DL (ref 3.5–5.2)
ALBUMIN/GLOB SERPL: 2.3 G/DL
ALP SERPL-CCNC: 61 U/L (ref 39–117)
ALT SERPL-CCNC: 30 U/L (ref 1–41)
AST SERPL-CCNC: 25 U/L (ref 1–40)
BILIRUB SERPL-MCNC: 0.8 MG/DL (ref 0–1.2)
BUN SERPL-MCNC: 19 MG/DL (ref 6–20)
BUN/CREAT SERPL: 16.8 (ref 7–25)
CALCIUM SERPL-MCNC: 9 MG/DL (ref 8.6–10.5)
CHLORIDE SERPL-SCNC: 107 MMOL/L (ref 98–107)
CHOLEST SERPL-MCNC: 171 MG/DL (ref 0–200)
CO2 SERPL-SCNC: 26.4 MMOL/L (ref 22–29)
CREAT SERPL-MCNC: 1.13 MG/DL (ref 0.76–1.27)
GLOBULIN SER CALC-MCNC: 1.9 GM/DL
GLUCOSE SERPL-MCNC: 95 MG/DL (ref 65–99)
HDLC SERPL-MCNC: 49 MG/DL (ref 40–60)
LDLC SERPL CALC-MCNC: 110 MG/DL (ref 0–100)
POTASSIUM SERPL-SCNC: 4.6 MMOL/L (ref 3.5–5.2)
PROT SERPL-MCNC: 6.3 G/DL (ref 6–8.5)
SODIUM SERPL-SCNC: 142 MMOL/L (ref 136–145)
TRIGL SERPL-MCNC: 63 MG/DL (ref 0–150)
VLDLC SERPL CALC-MCNC: 12 MG/DL (ref 5–40)

## 2021-03-25 ENCOUNTER — TELEPHONE (OUTPATIENT)
Dept: FAMILY MEDICINE CLINIC | Facility: CLINIC | Age: 56
End: 2021-03-25

## 2021-03-25 NOTE — TELEPHONE ENCOUNTER
Spoke with patient he said that you had mentioned a specific ointment for his toes and toenails.  I advised patient that you were out of the office, but I would forward the request to one of our other providers.  He said he would prefer to wait until you are back in the office.  He wanted to make sure it was the same ointment that you had discussed with him.

## 2021-03-25 NOTE — TELEPHONE ENCOUNTER
Left message for patient to return my call.  No documentation of ointment in last office note.  Called to see if this was ointment for toenails or toes/foot.

## 2021-03-25 NOTE — TELEPHONE ENCOUNTER
Caller: Carl Moraes    Relationship: Self    Best call back number:[phone]    What medication are you requesting: OINTMENT FOR TOES     What are your current symptoms: 9 MONTHS     If a prescription is needed, what is your preferred pharmacy and phone number:    [facility] [address] [place], [place] - [address] AT [facility] - [phone] [facility] [phone]   [phone]    Additional notes:  PATIENT STATED THAT HE WAS SEEN IN THE OFFICE 03/09/2021 AND WAS INFORMED THAT THIS MEDICATION WAS GOING TO BE CALLED IN AND THE PHARMACY HAS NOT  YET RECEIVED PRESCRIPTION.

## 2021-08-06 ENCOUNTER — OFFICE VISIT (OUTPATIENT)
Dept: FAMILY MEDICINE CLINIC | Facility: CLINIC | Age: 56
End: 2021-08-06

## 2021-08-06 VITALS
SYSTOLIC BLOOD PRESSURE: 110 MMHG | OXYGEN SATURATION: 96 % | DIASTOLIC BLOOD PRESSURE: 70 MMHG | TEMPERATURE: 97.6 F | BODY MASS INDEX: 26.66 KG/M2 | HEART RATE: 69 BPM | WEIGHT: 180 LBS | HEIGHT: 69 IN

## 2021-08-06 DIAGNOSIS — L30.9 DERMATITIS: Primary | ICD-10-CM

## 2021-08-06 DIAGNOSIS — J01.40 ACUTE PANSINUSITIS, RECURRENCE NOT SPECIFIED: ICD-10-CM

## 2021-08-06 PROCEDURE — 99213 OFFICE O/P EST LOW 20 MIN: CPT | Performed by: FAMILY MEDICINE

## 2021-08-06 PROCEDURE — 96372 THER/PROPH/DIAG INJ SC/IM: CPT | Performed by: FAMILY MEDICINE

## 2021-08-06 RX ORDER — METHYLPREDNISOLONE ACETATE 80 MG/ML
80 INJECTION, SUSPENSION INTRA-ARTICULAR; INTRALESIONAL; INTRAMUSCULAR; SOFT TISSUE ONCE
Status: COMPLETED | OUTPATIENT
Start: 2021-08-06 | End: 2021-08-06

## 2021-08-06 RX ORDER — CLOTRIMAZOLE AND BETAMETHASONE DIPROPIONATE 10; .64 MG/G; MG/G
CREAM TOPICAL 2 TIMES DAILY
Qty: 45 G | Refills: 2 | Status: SHIPPED | OUTPATIENT
Start: 2021-08-06

## 2021-08-06 RX ORDER — CEFDINIR 300 MG/1
300 CAPSULE ORAL 2 TIMES DAILY
Qty: 14 CAPSULE | Refills: 0 | Status: SHIPPED | OUTPATIENT
Start: 2021-08-06 | End: 2021-09-09

## 2021-08-06 RX ORDER — DEXAMETHASONE 0.5 MG/1
TABLET ORAL
Qty: 21 TABLET | Refills: 0 | Status: SHIPPED | OUTPATIENT
Start: 2021-08-06 | End: 2021-09-09

## 2021-08-06 RX ADMIN — METHYLPREDNISOLONE ACETATE 80 MG: 80 INJECTION, SUSPENSION INTRA-ARTICULAR; INTRALESIONAL; INTRAMUSCULAR; SOFT TISSUE at 17:51

## 2021-08-06 NOTE — PROGRESS NOTES
Established Patient        Chief Complaint:   Chief Complaint   Patient presents with   • Nausea     light headed, neck soreness and rash under bilateral arms        Carl Moraes is a 56 y.o. male    History of Present Illness:   For evaluation of positional/paroxysmal vertigo.  This coincides with frontal facial pressure and tenderness over the maxillary/frontal/ethmoid sinuses.  Denies any epistaxis.  Denies any nasal trauma.  He has had some increase in nasal congestion additionally.  This is been associated additionally with some perceived feeling of neck stiffness at times.  He denies any insect bites or injuries otherwise.  Denies any fever, chills or night sweats.  Patient also complains of a erythematous burning/itchy rash under both axilla.  He denies any chest pain, syncope or vertigo.  Denies any contact irritants.  He does spend a great deal of time outdoors, he feels this is likely secondary to increased perspiration due to the high heat/humidity recently.    Subjective     The following portions of the patient's history were reviewed and updated as appropriate: allergies, current medications, past family history, past medical history, past social history, past surgical history and problem list.    No Known Allergies    Review of Systems  1. Constitutional: Negative for fever. Negative for chills, diaphoresis, fatigue and unexpected weight change.   2. HENT: No dysphagia; no changes to vision/hearing/smell/taste; no epistaxis.  Heavy frontal facial pressure/tenderness over maxillary sinuses, including ethmoid and frontal sinuses.  No nasal trauma.  3. Eyes: Negative for redness and visual disturbance.   4. Respiratory: negative for shortness of breath. Negative for chest pain . Negative for cough and chest tightness.   5. Cardiovascular: Negative for chest pain and palpitations.   6. Gastrointestinal: Negative for abdominal distention, abdominal pain and blood in stool.   7. Endocrine: Negative for  "cold intolerance and heat intolerance.   8. Genitourinary: Negative for difficulty urinating, dysuria and frequency.   9. Musculoskeletal: Negative for arthralgias, back pain and myalgias.   10. Skin: Bilateral axilla rash as per above.  11. Neurological: Negative for syncope, weakness and headaches.   12. Hematological: Negative for adenopathy. Does not bruise/bleed easily.   13. Psychiatric/Behavioral: Negative for confusion. The patient is not nervous/anxious.    Objective     Physical Exam   Vital Signs: /70   Pulse 69   Temp 97.6 °F (36.4 °C)   Ht 175.3 cm (69\")   Wt 81.6 kg (180 lb)   SpO2 96%   BMI 26.58 kg/m²     General Appearance: alert, oriented x 3, no acute distress.  Skin: warm and dry.   HEENT: Atraumatic.  pupils round and reactive to light and accommodation, oral mucosa pink and moist.  Nares patent without epistaxis.  External auditory canals are patent tympanic membranes intact.  Tenderness to palpation over the maxillary, ethmoid and frontal sinus areas.  Boggy/inflamed nasal turbinates bilaterally.  There is a mild serous effusion bilateral tympanic membranes, however mobility is intact on Valsalva/insufflation.  Moderate postnasal drainage without pustules or exudate.  There is bilateral reactive lymphadenopathy of the anterior cervical triangles.  Normal range of motion of the cervical spine.  Neck: supple, no JVD, trachea midline.  No thyromegaly.  Lungs: CTA, unlabored breathing effort.  Heart: RRR, normal S1 and S2, no S3, no rub.  Abdomen: soft, non-tender, no palpable bladder, present bowel sounds to auscultation ×4.  No guarding or rigidity.  Extremities: no clubbing, cyanosis or edema.  Good range of motion actively and passively.  Symmetric muscle strength and development  Neuro: normal speech and mental status.  Cranial nerves II through XII intact.  No anosmia. DTR 2+; proprioception intact.  No focal motor/sensory deficits.    Assessment and Plan      Assessment: "   Diagnoses and all orders for this visit:    1. Dermatitis (Primary)  -     clotrimazole-betamethasone (Lotrisone) 1-0.05 % cream; Apply  topically to the appropriate area as directed 2 (Two) Times a Day.  Dispense: 45 g; Refill: 2    2. Acute pansinusitis, recurrence not specified  -     methylPREDNISolone acetate (DEPO-medrol) injection 80 mg  -     dexamethasone (DECADRON) 0.5 MG tablet; 6 po x1d; then 5 po x 1d; then 4 po x 1d; then 3 po x 1d; then 2 po x 1d; then 1 po x1d; then STOP  Dispense: 21 tablet; Refill: 0  -     cefdinir (OMNICEF) 300 MG capsule; Take 1 capsule by mouth 2 (Two) Times a Day.  Dispense: 14 capsule; Refill: 0        Plan:  I recommended treatment of acute pansinusitis with a 6-day low-dose dexamethasone taper, a 7-day course of cefdinir and an IM injection of Depo-Medrol 80 mg today.    Patient was also given a prescription for combination clotrimazole/betamethasone cream.  Is to be applied to affected areas in his axilla twice daily.  Should the area not improve, or worsen, I have asked that he notify the office for potential change in treatment regimen.    Discussion Summary:    I spent 25 minutes caring for Carl on this date of service. This time includes time spent by me in the following activities:preparing for the visit, performing a medically appropriate examination and/or evaluation , counseling and educating the patient/family/caregiver, ordering medications, tests, or procedures, documenting information in the medical record and care coordination     Discussed plan of care in detail with pt today; pt verb understanding and agrees.  Follow up:  No follow-ups on file.     There are no Patient Instructions on file for this visit.    Nicola Perry DO  08/06/21  17:40 EDT          Please note that portions of this note may have been completed with a voice recognition program. Efforts were made to edit the dictations, but occasionally words are mistranscribed.

## 2021-08-16 ENCOUNTER — TELEPHONE (OUTPATIENT)
Dept: FAMILY MEDICINE CLINIC | Facility: CLINIC | Age: 56
End: 2021-08-16

## 2021-08-16 NOTE — TELEPHONE ENCOUNTER
Caller: Carl Moraes    Relationship to patient: Self    Best call back number: [phone] (H)    Characteristics of symptom/severity: SINUS PRESSURE, EYE PRESSURE AND DIZZINESS     How long have you been experiencing symptoms: OVER A MONTH     When have you experienced or been treated for these symptoms before: 08/06/21    Have you had any recent surgeries, procedures or injections: [x] Yes  [] No   If yes, explain: STEROID     Is it the symptoms constant or intermittent: [x] Constant  [] Intermittent     What therapies/medications have you tried: CEFDINIR 300 MG AND DEXAMETHASONE     If a prescription is needed, what is your preferred pharmacy:   [facility] [address] - [address] [place] [address] [phone] [facility] [phone] [address] [address]  Phone: [phone] Fax: [phone]    [facility] [address]  [facility] [address]  [place]. [address] [place] [address]  Phone: [phone] Fax: [phone]

## 2021-09-09 ENCOUNTER — OFFICE VISIT (OUTPATIENT)
Dept: FAMILY MEDICINE CLINIC | Facility: CLINIC | Age: 56
End: 2021-09-09

## 2021-09-09 VITALS
OXYGEN SATURATION: 98 % | HEART RATE: 69 BPM | HEIGHT: 69 IN | WEIGHT: 184 LBS | BODY MASS INDEX: 27.25 KG/M2 | SYSTOLIC BLOOD PRESSURE: 115 MMHG | DIASTOLIC BLOOD PRESSURE: 70 MMHG

## 2021-09-09 DIAGNOSIS — J32.0 CHRONIC MAXILLARY SINUSITIS: Primary | ICD-10-CM

## 2021-09-09 DIAGNOSIS — B35.4 TINEA CORPORIS: ICD-10-CM

## 2021-09-09 DIAGNOSIS — K21.9 GERD WITHOUT ESOPHAGITIS: ICD-10-CM

## 2021-09-09 PROCEDURE — 99213 OFFICE O/P EST LOW 20 MIN: CPT | Performed by: FAMILY MEDICINE

## 2021-09-09 RX ORDER — DEXAMETHASONE 0.5 MG/1
TABLET ORAL
Qty: 21 TABLET | Refills: 0 | Status: SHIPPED | OUTPATIENT
Start: 2021-09-09 | End: 2022-03-15

## 2021-09-09 RX ORDER — CLINDAMYCIN HYDROCHLORIDE 150 MG/1
150 CAPSULE ORAL
Qty: 42 CAPSULE | Refills: 0 | Status: SHIPPED | OUTPATIENT
Start: 2021-09-09 | End: 2021-09-23

## 2021-09-09 NOTE — PROGRESS NOTES
Established Patient        Chief Complaint:   Chief Complaint   Patient presents with   • Follow-up     6 month general follow up.        Carl Moraes is a 56 y.o. male    History of Present Illness:   Here today for scheduled follow-up visit of his GERD, axillary dermatitis and recurrent sinusitis.    He denies any aspiration or dysphagia.  Denies any BRB/BTS.  Reports adequate hydration, appropriate dietary intake.  Denies any chest pain, syncope, palpitations or vertigo.  He reports significant improvement to his facial pressure and sinus tenderness with most recent treatment regimen.  However, his symptoms have returned and progressively worsened since that time, although still approximately 50% improved.    He denies any fever, chills or night sweats.  No epistaxis or hemoptysis.  He reports improvement to the dermatitis of his axilla with use of Lotrisone cream, however the symptoms do return after discontinuation.    Subjective     The following portions of the patient's history were reviewed and updated as appropriate: allergies, current medications, past family history, past medical history, past social history, past surgical history and problem list.    No Known Allergies    Review of Systems  1. Constitutional: Negative for fever. Negative for chills, diaphoresis, fatigue and unexpected weight change.   2. HENT: No dysphagia; no changes to vision/hearing/smell/taste; no epistaxis, otherwise as per above.  3. Eyes: Negative for redness and visual disturbance.   4. Respiratory: negative for shortness of breath. Negative for chest pain . Negative for cough and chest tightness.   5. Cardiovascular: Negative for chest pain and palpitations.   6. Gastrointestinal: Negative for abdominal distention, abdominal pain and blood in stool.   7. Endocrine: Negative for cold intolerance and heat intolerance.   8. Genitourinary: Negative for difficulty urinating, dysuria and frequency.   9. Musculoskeletal: Negative  "for arthralgias, back pain and myalgias.   10. Skin: As per above.  11. Neurological: Negative for syncope, weakness and headaches.   12. Hematological: Negative for adenopathy. Does not bruise/bleed easily.   13. Psychiatric/Behavioral: Negative for confusion. The patient is not nervous/anxious.    Objective     Physical Exam   Vital Signs: /70   Pulse 69   Ht 175.3 cm (69.02\")   Wt 83.5 kg (184 lb)   SpO2 98%   BMI 27.16 kg/m²     General Appearance: alert, oriented x 3, no acute distress.  Skin: warm and dry.  Erythematous rash to the axilla, signs of mild excoriation.  HEENT: Atraumatic.  pupils round and reactive to light and accommodation, oral mucosa pink and moist.  Nares patent without epistaxis.  External auditory canals are patent tympanic membranes intact.  Boggy/inflamed nasal turbinates, tenderness on palpation of maxillary sinuses.  Neck: supple, no JVD, trachea midline.  No thyromegaly  Lungs: CTA, unlabored breathing effort.  Heart: RRR, normal S1 and S2, no S3, no rub.  Abdomen: soft, non-tender, no palpable bladder, present bowel sounds to auscultation ×4.  No guarding or rigidity.  Extremities: no clubbing, cyanosis or edema.  Good range of motion actively and passively.  Symmetric muscle strength and development  Neuro: normal speech and mental status.  Cranial nerves II through XII intact.  No anosmia. DTR 2+; proprioception intact.  No focal motor/sensory deficits.    Assessment and Plan      Assessment:   Diagnoses and all orders for this visit:    1. Chronic maxillary sinusitis (Primary)  -     clindamycin (CLEOCIN) 150 MG capsule; Take 1 capsule by mouth 3 (Three) Times a Day With Meals for 14 days.  Dispense: 42 capsule; Refill: 0  -     dexamethasone (DECADRON) 0.5 MG tablet; 6 po x1d; then 5 po x 1d; then 4 po x 1d; then 3 po x 1d; then 2 po x 1d; then 1 po x1d; then STOP  Dispense: 21 tablet; Refill: 0  -     CT Sinus Without Contrast; Future  -     Ambulatory Referral to ENT " (Otolaryngology)    2. Tinea corporis    3. GERD without esophagitis        Plan:  Planned treatment with a 14-day course of clindamycin, as well as a 6-day course of dexamethasone.  I have recommended a CT scan after completion of antibiotics and steroids, this should be in approximately 2 weeks.  I have placed a referral order to be seen by ENT after completion of CT scan.  Should patient develop any ill effects to the above antibiotic regimen, vastly notify the office immediately for potential change in treatment regimen.    Continue Lotrisone in treatment of his axillary dermatitis.    Continue PPI therapy.  Continue dietary/lifestyle modifications to aid in symptom management of his chronic GERD.  Discussion Summary:    I spent 25 minutes caring for Carl on this date of service. This time includes time spent by me in the following activities:preparing for the visit, performing a medically appropriate examination and/or evaluation , counseling and educating the patient/family/caregiver, ordering medications, tests, or procedures, documenting information in the medical record and care coordination     Discussed plan of care in detail with pt today; pt verb understanding and agrees.  Follow up:  No follow-ups on file.     There are no Patient Instructions on file for this visit.    Nicola Perry DO  09/09/21  15:05 EDT          Please note that portions of this note may have been completed with a voice recognition program. Efforts were made to edit the dictations, but occasionally words are mistranscribed.

## 2021-09-28 ENCOUNTER — HOSPITAL ENCOUNTER (OUTPATIENT)
Dept: CT IMAGING | Facility: HOSPITAL | Age: 56
Discharge: HOME | End: 2021-09-28
Admitting: FAMILY MEDICINE

## 2021-09-28 DIAGNOSIS — J32.0 CHRONIC MAXILLARY SINUSITIS: ICD-10-CM

## 2021-09-28 PROCEDURE — 70486 CT MAXILLOFACIAL W/O DYE: CPT

## 2022-03-15 ENCOUNTER — OFFICE VISIT (OUTPATIENT)
Dept: FAMILY MEDICINE CLINIC | Facility: CLINIC | Age: 57
End: 2022-03-15

## 2022-03-15 VITALS
HEART RATE: 69 BPM | DIASTOLIC BLOOD PRESSURE: 82 MMHG | BODY MASS INDEX: 26.72 KG/M2 | OXYGEN SATURATION: 98 % | SYSTOLIC BLOOD PRESSURE: 112 MMHG | WEIGHT: 180.4 LBS | TEMPERATURE: 96.6 F | HEIGHT: 69 IN

## 2022-03-15 DIAGNOSIS — Z12.5 PROSTATE CANCER SCREENING: ICD-10-CM

## 2022-03-15 DIAGNOSIS — Z00.00 ROUTINE GENERAL MEDICAL EXAMINATION AT A HEALTH CARE FACILITY: Primary | ICD-10-CM

## 2022-03-15 PROCEDURE — 99396 PREV VISIT EST AGE 40-64: CPT | Performed by: FAMILY MEDICINE

## 2022-03-15 RX ORDER — AZELASTINE HYDROCHLORIDE 137 UG/1
SPRAY, METERED NASAL
COMMUNITY
Start: 2022-03-09

## 2022-03-15 RX ORDER — ESCITALOPRAM OXALATE 10 MG/1
TABLET ORAL
COMMUNITY
Start: 2022-01-08 | End: 2022-06-14 | Stop reason: SDUPTHER

## 2022-03-15 RX ORDER — DIPHENHYDRAMINE HCL 25 MG
CAPSULE ORAL
COMMUNITY
Start: 2022-02-22

## 2022-03-15 NOTE — PATIENT INSTRUCTIONS
Preventive Care 40-64 Years Old, Male  Preventive care refers to lifestyle choices and visits with your health care provider that can promote health and wellness. This includes:  A yearly physical exam. This is also called an annual well check.  Regular dental and eye exams.  Immunizations.  Screening for certain conditions.  Healthy lifestyle choices, such as eating a healthy diet, getting regular exercise, not using drugs or products that contain nicotine and tobacco, and limiting alcohol use.  What can I expect for my preventive care visit?  Physical exam  Your health care provider will check:  Height and weight. These may be used to calculate body mass index (BMI), which is a measurement that tells if you are at a healthy weight.  Heart rate and blood pressure.  Your skin for abnormal spots.  Counseling  Your health care provider may ask you questions about:  Alcohol, tobacco, and drug use.  Emotional well-being.  Home and relationship well-being.  Sexual activity.  Eating habits.  Work and work environment.  What immunizations do I need?    Influenza (flu) vaccine  This is recommended every year.  Tetanus, diphtheria, and pertussis (Tdap) vaccine  You may need a Td booster every 10 years.  Varicella (chickenpox) vaccine  You may need this vaccine if you have not already been vaccinated.  Zoster (shingles) vaccine  You may need this after age 60.  Measles, mumps, and rubella (MMR) vaccine  You may need at least one dose of MMR if you were born in 1957 or later. You may also need a second dose.  Pneumococcal conjugate (PCV13) vaccine  You may need this if you have certain conditions and were not previously vaccinated.  Pneumococcal polysaccharide (PPSV23) vaccine  You may need one or two doses if you smoke cigarettes or if you have certain conditions.  Meningococcal conjugate (MenACWY) vaccine  You may need this if you have certain conditions.  Hepatitis A vaccine  You may need this if you have certain conditions  or if you travel or work in places where you may be exposed to hepatitis A.  Hepatitis B vaccine  You may need this if you have certain conditions or if you travel or work in places where you may be exposed to hepatitis B.  Haemophilus influenzae type b (Hib) vaccine  You may need this if you have certain risk factors.  Human papillomavirus (HPV) vaccine  If recommended by your health care provider, you may need three doses over 6 months.  You may receive vaccines as individual doses or as more than one vaccine together in one shot (combination vaccines). Talk with your health care provider about the risks and benefits of combination vaccines.  What tests do I need?  Blood tests  Lipid and cholesterol levels. These may be checked every 5 years, or more frequently if you are over 50 years old.  Hepatitis C test.  Hepatitis B test.  Screening  Lung cancer screening. You may have this screening every year starting at age 55 if you have a 30-pack-year history of smoking and currently smoke or have quit within the past 15 years.  Prostate cancer screening. Recommendations will vary depending on your family history and other risks.  Colorectal cancer screening. All adults should have this screening starting at age 50 and continuing until age 75. Your health care provider may recommend screening at age 45 if you are at increased risk. You will have tests every 1-10 years, depending on your results and the type of screening test.  Diabetes screening. This is done by checking your blood sugar (glucose) after you have not eaten for a while (fasting). You may have this done every 1-3 years.  Sexually transmitted disease (STD) testing.  Follow these instructions at home:  Eating and drinking  Eat a diet that includes fresh fruits and vegetables, whole grains, lean protein, and low-fat dairy products.  Take vitamin and mineral supplements as recommended by your health care provider.  Do not drink alcohol if your health care  provider tells you not to drink.  If you drink alcohol:  Limit how much you have to 0-2 drinks a day.  Be aware of how much alcohol is in your drink. In the [place] one drink equals one 12 oz bottle of beer (355 mL), one 5 oz glass of wine (148 mL), or one 1½ oz glass of hard liquor (44 mL).  Lifestyle  Take daily care of your teeth and gums.  Stay active. Exercise for at least 30 minutes on 5 or more days each week.  Do not use any products that contain nicotine or tobacco, such as cigarettes, e-cigarettes, and chewing tobacco. If you need help quitting, ask your health care provider.  If you are sexually active, practice safe sex. Use a condom or other form of protection to prevent STIs (sexually transmitted infections).  Talk with your health care provider about taking a low-dose aspirin every day starting at age 50.  What's next?  Go to your health care provider once a year for a well check visit.  Ask your health care provider how often you should have your eyes and teeth checked.  Stay up to date on all vaccines.  This information is not intended to replace advice given to you by your health care provider. Make sure you discuss any questions you have with your health care provider.

## 2022-03-15 NOTE — PROGRESS NOTES
"    Established Patient        Chief Complaint:   Chief Complaint   Patient presents with   • Annual Exam        Carl Moraes is a 57 y.o. male    History of Present Illness:   Here today for scheduled annual/preventative healthcare examination.    He maintains an active lifestyle, balanced dietary intake.  He reports good urine output without hematuria or dysuria.  Denies any BRB/BTS.  No chest pain, syncope, palpitations or vertigo.    Subjective     The following portions of the patient's history were reviewed and updated as appropriate: allergies, current medications, past family history, past medical history, past social history, past surgical history and problem list.    No Known Allergies    Review of Systems  1. Constitutional: Negative for fever. Negative for chills, diaphoresis, fatigue and unexpected weight change.   2. HENT: No dysphagia; no changes to vision/hearing/smell/taste; no epistaxis.  3. Eyes: Negative for redness and visual disturbance.   4. Respiratory: negative for shortness of breath. Negative for chest pain . Negative for cough and chest tightness.   5. Cardiovascular: Negative for chest pain and palpitations.   6. Gastrointestinal: Negative for abdominal distention, abdominal pain and blood in stool.   7. Endocrine: Negative for cold intolerance and heat intolerance.   8. Genitourinary: Negative for difficulty urinating, dysuria and frequency.   9. Musculoskeletal: Negative for arthralgias, back pain and myalgias.   10. Skin: As per above.  11. Neurological: Negative for syncope, weakness and headaches.   12. Hematological: Negative for adenopathy. Does not bruise/bleed easily.   13. Psychiatric/Behavioral: Negative for confusion. The patient is not nervous/anxious.    Objective     Physical Exam   Vital Signs: /82 (BP Location: Left arm, Patient Position: Sitting, Cuff Size: Adult)   Pulse 69   Temp 96.6 °F (35.9 °C) (Infrared)   Ht 175.3 cm (69.02\")   Wt 81.8 kg (180 lb 6.4 oz) "   SpO2 98%   BMI 26.62 kg/m²     General Appearance: alert, oriented x 3, no acute distress.  Skin: warm and dry.  Erythematous rash to the axilla, signs of mild excoriation.  HEENT: Atraumatic.  pupils round and reactive to light and accommodation, oral mucosa pink and moist.  Nares patent without epistaxis.  External auditory canals are patent tympanic membranes intact.   Neck: supple, no JVD, trachea midline.  No thyromegaly  Lungs: CTA, unlabored breathing effort.  Heart: RRR, normal S1 and S2, no S3, no rub.  Abdomen: soft, non-tender, no palpable bladder, present bowel sounds to auscultation ×4.  No guarding or rigidity.  Extremities: no clubbing, cyanosis or edema.  Good range of motion actively and passively.  Symmetric muscle strength and development  Neuro: normal speech and mental status.  Cranial nerves II through XII intact.  No anosmia. DTR 2+; proprioception intact.  No focal motor/sensory deficits.    Advance Care Planning   ACP discussion was held with the patient during this visit. Patient does not have an advance directive, information provided.       Assessment and Plan      Assessment:   Diagnoses and all orders for this visit:    1. Routine general medical examination at a health care facility (Primary)  -     Comprehensive metabolic panel  -     CBC and Differential    2. Prostate cancer screening  -     PSA SCREENING        Plan:    I have discussed age/gender specific preventative healthcare issues in detail with patient today.  I have answered all of the questions.    Discussed need for reduction in sodium/salt/caffeine intake; improve sleep habits as able; inc formal CV exercise program with goal of vigorous activity most, if not all, days of the week; goal of 250 min of sustained HR CV exercise total per week.    Routine surveillance labs today including CMP/CBC/PSA; most recent lipid panel completed 1 year ago was clinically normal.  I recommended a biannual evaluation of his lipid  panel.  Continue low-cholesterol dietary intake, as well as other healthy dietary choices.    Discussed need for stress/anxiety reducing techniques such as prayer/meditation/breathing and counting exercises and avoidance of stress producing environments/situations; will follow clinically.        Discussion Summary:    I spent 30 minutes caring for Carl on this date of service. This time includes time spent by me in the following activities:preparing for the visit, performing a medically appropriate examination and/or evaluation , counseling and educating the patient/family/caregiver, ordering medications, tests, or procedures, documenting information in the medical record and care coordination    I have reviewed and updated all copied forward information, as appropriate.  I attest to the accuracy and relevance of any unchanged information.    Discussed plan of care in detail with pt today; pt verb understanding and agrees.  Follow up:  No follow-ups on file.     Patient Instructions   Preventive Care 40-64 Years Old, Male  Preventive care refers to lifestyle choices and visits with your health care provider that can promote health and wellness. This includes:  · A yearly physical exam. This is also called an annual well check.  · Regular dental and eye exams.  · Immunizations.  · Screening for certain conditions.  · Healthy lifestyle choices, such as eating a healthy diet, getting regular exercise, not using drugs or products that contain nicotine and tobacco, and limiting alcohol use.  What can I expect for my preventive care visit?  Physical exam  Your health care provider will check:  · Height and weight. These may be used to calculate body mass index (BMI), which is a measurement that tells if you are at a healthy weight.  · Heart rate and blood pressure.  · Your skin for abnormal spots.  Counseling  Your health care provider may ask you questions about:  · Alcohol, tobacco, and drug use.  · Emotional  well-being.  · Home and relationship well-being.  · Sexual activity.  · Eating habits.  · Work and work environment.  What immunizations do I need?    Influenza (flu) vaccine  · This is recommended every year.  Tetanus, diphtheria, and pertussis (Tdap) vaccine  · You may need a Td booster every 10 years.  Varicella (chickenpox) vaccine  · You may need this vaccine if you have not already been vaccinated.  Zoster (shingles) vaccine  · You may need this after age 60.  Measles, mumps, and rubella (MMR) vaccine  · You may need at least one dose of MMR if you were born in 1957 or later. You may also need a second dose.  Pneumococcal conjugate (PCV13) vaccine  · You may need this if you have certain conditions and were not previously vaccinated.  Pneumococcal polysaccharide (PPSV23) vaccine  · You may need one or two doses if you smoke cigarettes or if you have certain conditions.  Meningococcal conjugate (MenACWY) vaccine  · You may need this if you have certain conditions.  Hepatitis A vaccine  · You may need this if you have certain conditions or if you travel or work in places where you may be exposed to hepatitis A.  Hepatitis B vaccine  · You may need this if you have certain conditions or if you travel or work in places where you may be exposed to hepatitis B.  Haemophilus influenzae type b (Hib) vaccine  · You may need this if you have certain risk factors.  Human papillomavirus (HPV) vaccine  · If recommended by your health care provider, you may need three doses over 6 months.  You may receive vaccines as individual doses or as more than one vaccine together in one shot (combination vaccines). Talk with your health care provider about the risks and benefits of combination vaccines.  What tests do I need?  Blood tests  · Lipid and cholesterol levels. These may be checked every 5 years, or more frequently if you are over 50 years old.  · Hepatitis C test.  · Hepatitis B test.  Screening  · Lung cancer screening.  You may have this screening every year starting at age 55 if you have a 30-pack-year history of smoking and currently smoke or have quit within the past 15 years.  · Prostate cancer screening. Recommendations will vary depending on your family history and other risks.  · Colorectal cancer screening. All adults should have this screening starting at age 50 and continuing until age 75. Your health care provider may recommend screening at age 45 if you are at increased risk. You will have tests every 1-10 years, depending on your results and the type of screening test.  · Diabetes screening. This is done by checking your blood sugar (glucose) after you have not eaten for a while (fasting). You may have this done every 1-3 years.  · Sexually transmitted disease (STD) testing.  Follow these instructions at home:  Eating and drinking  1. Eat a diet that includes fresh fruits and vegetables, whole grains, lean protein, and low-fat dairy products.  2. Take vitamin and mineral supplements as recommended by your health care provider.  3. Do not drink alcohol if your health care provider tells you not to drink.  4. If you drink alcohol:  ? Limit how much you have to 0-2 drinks a day.  ? Be aware of how much alcohol is in your drink. In the [place] one drink equals one 12 oz bottle of beer (355 mL), one 5 oz glass of wine (148 mL), or one 1½ oz glass of hard liquor (44 mL).  Lifestyle  · Take daily care of your teeth and gums.  · Stay active. Exercise for at least 30 minutes on 5 or more days each week.  · Do not use any products that contain nicotine or tobacco, such as cigarettes, e-cigarettes, and chewing tobacco. If you need help quitting, ask your health care provider.  · If you are sexually active, practice safe sex. Use a condom or other form of protection to prevent STIs (sexually transmitted infections).  · Talk with your health care provider about taking a low-dose aspirin every day starting at age 50.  What's  next?  · Go to your health care provider once a year for a well check visit.  · Ask your health care provider how often you should have your eyes and teeth checked.  · Stay up to date on all vaccines.  This information is not intended to replace advice given to you by your health care provider. Make sure you discuss any questions you have with your health care provider.      Nicola Perry,   03/16/22  09:37 EDT          Please note that portions of this note may have been completed with a voice recognition program. Efforts were made to edit the dictations, but occasionally words are mistranscribed.

## 2022-03-16 LAB
ALBUMIN SERPL-MCNC: 4.5 G/DL (ref 3.5–5.2)
ALBUMIN/GLOB SERPL: 2 G/DL
ALP SERPL-CCNC: 75 U/L (ref 39–117)
ALT SERPL-CCNC: 24 U/L (ref 1–41)
AST SERPL-CCNC: 19 U/L (ref 1–40)
BASOPHILS # BLD AUTO: 0.02 10*3/MM3 (ref 0–0.2)
BASOPHILS NFR BLD AUTO: 0.6 % (ref 0–1.5)
BILIRUB SERPL-MCNC: 0.3 MG/DL (ref 0–1.2)
BUN SERPL-MCNC: 18 MG/DL (ref 6–20)
BUN/CREAT SERPL: 15.9 (ref 7–25)
CALCIUM SERPL-MCNC: 9.1 MG/DL (ref 8.6–10.5)
CHLORIDE SERPL-SCNC: 107 MMOL/L (ref 98–107)
CO2 SERPL-SCNC: 26.4 MMOL/L (ref 22–29)
CREAT SERPL-MCNC: 1.13 MG/DL (ref 0.76–1.27)
EGFRCR SERPLBLD CKD-EPI 2021: 75.3 ML/MIN/1.73
EOSINOPHIL # BLD AUTO: 0.12 10*3/MM3 (ref 0–0.4)
EOSINOPHIL NFR BLD AUTO: 3.7 % (ref 0.3–6.2)
ERYTHROCYTE [DISTWIDTH] IN BLOOD BY AUTOMATED COUNT: 13.1 % (ref 12.3–15.4)
GLOBULIN SER CALC-MCNC: 2.3 GM/DL
GLUCOSE SERPL-MCNC: 102 MG/DL (ref 65–99)
HCT VFR BLD AUTO: 45.6 % (ref 37.5–51)
HGB BLD-MCNC: 15.1 G/DL (ref 13–17.7)
IMM GRANULOCYTES # BLD AUTO: 0.01 10*3/MM3 (ref 0–0.05)
IMM GRANULOCYTES NFR BLD AUTO: 0.3 % (ref 0–0.5)
LYMPHOCYTES # BLD AUTO: 1.3 10*3/MM3 (ref 0.7–3.1)
LYMPHOCYTES NFR BLD AUTO: 39.6 % (ref 19.6–45.3)
MCH RBC QN AUTO: 29.7 PG (ref 26.6–33)
MCHC RBC AUTO-ENTMCNC: 33.1 G/DL (ref 31.5–35.7)
MCV RBC AUTO: 89.6 FL (ref 79–97)
MONOCYTES # BLD AUTO: 0.32 10*3/MM3 (ref 0.1–0.9)
MONOCYTES NFR BLD AUTO: 9.8 % (ref 5–12)
NEUTROPHILS # BLD AUTO: 1.51 10*3/MM3 (ref 1.7–7)
NEUTROPHILS NFR BLD AUTO: 46 % (ref 42.7–76)
NRBC BLD AUTO-RTO: 0 /100 WBC (ref 0–0.2)
PLATELET # BLD AUTO: 181 10*3/MM3 (ref 140–450)
POTASSIUM SERPL-SCNC: 4.5 MMOL/L (ref 3.5–5.2)
PROT SERPL-MCNC: 6.8 G/DL (ref 6–8.5)
PSA SERPL-MCNC: 1.07 NG/ML (ref 0–4)
RBC # BLD AUTO: 5.09 10*6/MM3 (ref 4.14–5.8)
SODIUM SERPL-SCNC: 143 MMOL/L (ref 136–145)
WBC # BLD AUTO: 3.28 10*3/MM3 (ref 3.4–10.8)

## 2022-03-23 NOTE — TELEPHONE ENCOUNTER
Caller: DimitryCarl    Relationship: Self    Best call back number: [phone]    Requested Prescriptions:   Requested Prescriptions     Pending Prescriptions Disp Refills   • pantoprazole (Protonix) 40 MG EC tablet 90 tablet 3     Sig: Take 1 tablet by mouth Daily.        Pharmacy where request should be sent: [facility] [address]      Additional details provided by patient: 5 TABLETS LEFT     Does the patient have less than a 3 day supply:  [] Yes  [x] No    Fidel Kaye Rep   03/23/22 12:38 EDT

## 2022-03-25 RX ORDER — PANTOPRAZOLE SODIUM 40 MG/1
40 TABLET, DELAYED RELEASE ORAL DAILY
Qty: 90 TABLET | Refills: 3 | Status: SHIPPED | OUTPATIENT
Start: 2022-03-25 | End: 2023-01-29 | Stop reason: SDUPTHER

## 2022-03-25 RX ORDER — PANTOPRAZOLE SODIUM 40 MG/1
40 TABLET, DELAYED RELEASE ORAL DAILY
Qty: 90 TABLET | Refills: 3 | Status: SHIPPED | OUTPATIENT
Start: 2022-03-25 | End: 2022-03-25 | Stop reason: SDUPTHER

## 2022-06-14 DIAGNOSIS — M25.561 ARTHRALGIA OF BOTH KNEES: ICD-10-CM

## 2022-06-14 DIAGNOSIS — M25.562 ARTHRALGIA OF BOTH KNEES: ICD-10-CM

## 2022-06-14 RX ORDER — ESCITALOPRAM OXALATE 10 MG/1
10 TABLET ORAL DAILY
Qty: 30 TABLET | Refills: 2 | Status: SHIPPED | OUTPATIENT
Start: 2022-06-14 | End: 2022-08-26 | Stop reason: SDUPTHER

## 2022-06-14 RX ORDER — MELOXICAM 7.5 MG/1
7.5 TABLET ORAL DAILY
Qty: 90 TABLET | Refills: 3 | Status: SHIPPED | OUTPATIENT
Start: 2022-06-14 | End: 2022-07-28 | Stop reason: SDUPTHER

## 2022-06-14 NOTE — TELEPHONE ENCOUNTER
Caller: Carl Moraes    Relationship: Self    Best call back number: [phone]    Requested Prescriptions:   Requested Prescriptions     Pending Prescriptions Disp Refills   • meloxicam (Mobic) 7.5 MG tablet 90 tablet 3     Sig: Take 1 tablet by mouth Daily.   • escitalopram (LEXAPRO) 10 MG tablet          Pharmacy where request should be sent: [facility]      Additional details provided by patient:     Does the patient have less than a 3 day supply:  [x] Yes  [] No    SiFidel Vann Rep   06/14/22 13:17 EDT

## 2022-07-28 ENCOUNTER — OFFICE VISIT (OUTPATIENT)
Dept: FAMILY MEDICINE CLINIC | Facility: CLINIC | Age: 57
End: 2022-07-28

## 2022-07-28 VITALS
DIASTOLIC BLOOD PRESSURE: 84 MMHG | RESPIRATION RATE: 16 BRPM | BODY MASS INDEX: 26.04 KG/M2 | TEMPERATURE: 97.6 F | HEIGHT: 69 IN | SYSTOLIC BLOOD PRESSURE: 122 MMHG | OXYGEN SATURATION: 97 % | HEART RATE: 84 BPM | WEIGHT: 175.8 LBS

## 2022-07-28 DIAGNOSIS — S60.352A: Primary | ICD-10-CM

## 2022-07-28 DIAGNOSIS — M25.561 ARTHRALGIA OF BOTH KNEES: ICD-10-CM

## 2022-07-28 DIAGNOSIS — M25.562 ARTHRALGIA OF BOTH KNEES: ICD-10-CM

## 2022-07-28 DIAGNOSIS — L08.9: Primary | ICD-10-CM

## 2022-07-28 PROCEDURE — 26010 DRAINAGE OF FINGER ABSCESS: CPT | Performed by: NURSE PRACTITIONER

## 2022-07-28 PROCEDURE — 99214 OFFICE O/P EST MOD 30 MIN: CPT | Performed by: NURSE PRACTITIONER

## 2022-07-28 RX ORDER — MELOXICAM 7.5 MG/1
7.5 TABLET ORAL DAILY
Qty: 90 TABLET | Refills: 3 | Status: SHIPPED | OUTPATIENT
Start: 2022-07-28 | End: 2022-08-26 | Stop reason: SDUPTHER

## 2022-07-28 RX ORDER — AMOXICILLIN AND CLAVULANATE POTASSIUM 875; 125 MG/1; MG/1
1 TABLET, FILM COATED ORAL 2 TIMES DAILY
Qty: 10 TABLET | Refills: 0 | Status: SHIPPED | OUTPATIENT
Start: 2022-07-28 | End: 2023-03-10

## 2022-08-26 DIAGNOSIS — M25.561 ARTHRALGIA OF BOTH KNEES: ICD-10-CM

## 2022-08-26 DIAGNOSIS — M25.562 ARTHRALGIA OF BOTH KNEES: ICD-10-CM

## 2022-08-26 RX ORDER — MELOXICAM 7.5 MG/1
7.5 TABLET ORAL DAILY
Qty: 90 TABLET | Refills: 3 | Status: SHIPPED | OUTPATIENT
Start: 2022-08-26 | End: 2022-09-30 | Stop reason: SDUPTHER

## 2022-08-26 RX ORDER — ESCITALOPRAM OXALATE 10 MG/1
10 TABLET ORAL DAILY
Qty: 30 TABLET | Refills: 2 | Status: SHIPPED | OUTPATIENT
Start: 2022-08-26 | End: 2022-09-30 | Stop reason: SDUPTHER

## 2022-08-26 NOTE — TELEPHONE ENCOUNTER
Patient is needing refills on Meloxicam and Lexapro. Refills need to be sent to [facility], Phone number [phone], address [address] [place] [address]

## 2022-09-30 DIAGNOSIS — M25.561 ARTHRALGIA OF BOTH KNEES: ICD-10-CM

## 2022-09-30 DIAGNOSIS — M25.562 ARTHRALGIA OF BOTH KNEES: ICD-10-CM

## 2022-09-30 RX ORDER — MELOXICAM 7.5 MG/1
7.5 TABLET ORAL DAILY
Qty: 90 TABLET | Refills: 3 | Status: SHIPPED | OUTPATIENT
Start: 2022-09-30 | End: 2022-10-06 | Stop reason: SDUPTHER

## 2022-09-30 RX ORDER — ESCITALOPRAM OXALATE 10 MG/1
10 TABLET ORAL DAILY
Qty: 30 TABLET | Refills: 2 | Status: SHIPPED | OUTPATIENT
Start: 2022-09-30 | End: 2022-12-15 | Stop reason: SDUPTHER

## 2022-09-30 NOTE — TELEPHONE ENCOUNTER
Caller: Carl Moraes    Relationship: Self    Best call back number: [phone]    Requested Prescriptions:   Requested Prescriptions     Pending Prescriptions Disp Refills   • meloxicam (Mobic) 7.5 MG tablet 90 tablet 3     Sig: Take 1 tablet by mouth Daily.   • escitalopram (LEXAPRO) 10 MG tablet 30 tablet 2     Sig: Take 1 tablet by mouth Daily.        Pharmacy where request should be sent: [facility] [address]      Additional details provided by patient:  PATIENT HAS BEEN OUT OF THE MELOXICAM FOR A FEW MONTHS. PATIENT HAS BEEN HAVING TROUBLE GETTING THIS CALLED IN TO HIS PHARMACY THEY ARE NOT RECEIVING HIS SCRIPT.    E-SCRIBE: [phone]    Does the patient have less than a 3 day supply:  [x] Yes  [] No    Fidel Tavarez Rep   09/30/22 10:09 EDT

## 2022-10-04 ENCOUNTER — TELEPHONE (OUTPATIENT)
Dept: FAMILY MEDICINE CLINIC | Facility: CLINIC | Age: 57
End: 2022-10-04

## 2022-10-04 DIAGNOSIS — M25.562 ARTHRALGIA OF BOTH KNEES: ICD-10-CM

## 2022-10-04 DIAGNOSIS — M25.561 ARTHRALGIA OF BOTH KNEES: ICD-10-CM

## 2022-10-04 NOTE — TELEPHONE ENCOUNTER
[facility] is requesting maloxacam 7.5mg and Escitalopram 10 mg. They want it verbally called in she said. Says they requested it 2 months ago and never got it/

## 2022-10-06 RX ORDER — MELOXICAM 7.5 MG/1
7.5 TABLET ORAL DAILY
Qty: 90 TABLET | Refills: 0 | Status: SHIPPED | OUTPATIENT
Start: 2022-10-06 | End: 2022-10-06 | Stop reason: SDUPTHER

## 2022-10-06 RX ORDER — MELOXICAM 7.5 MG/1
7.5 TABLET ORAL DAILY
Qty: 90 TABLET | Refills: 3 | Status: SHIPPED | OUTPATIENT
Start: 2022-10-06 | End: 2022-10-06 | Stop reason: SDUPTHER

## 2022-10-06 RX ORDER — MELOXICAM 7.5 MG/1
7.5 TABLET ORAL DAILY
Qty: 90 TABLET | Refills: 3 | Status: SHIPPED | OUTPATIENT
Start: 2022-10-06 | End: 2022-12-15 | Stop reason: SDUPTHER

## 2022-10-06 NOTE — TELEPHONE ENCOUNTER
Caller: COREY TRIANA    Relationship: Emergency Contact    Best call back number: [phone]    Requested Prescriptions:   Requested Prescriptions     Pending Prescriptions Disp Refills   • meloxicam (Mobic) 7.5 MG tablet 90 tablet 3     Sig: Take 1 tablet by mouth Daily.        Pharmacy where request should be sent: [facility] [id number] [address] AT [facility] [phone] [facility] [phone] FX     Additional details provided by patient: OUT OF REFILLS AND HE HAS NOT HAD THEM FOR THREE MONTHS. IT WOULD NORMALLY GO TO [facility] BUT THEY JUST WANT US TO SEND IT TO THE LOCAL PHARMACY.     IF WE SEND THIS TO THE [facility] CAN WE MAKE SURE [facility] WILL CONTINUE IT AFTER THAT.    Does the patient have less than a 3 day supply:  [x] Yes  [] No    Servando Lind, PCT   10/06/22 10:30 EDT

## 2022-12-15 ENCOUNTER — TELEPHONE (OUTPATIENT)
Dept: FAMILY MEDICINE CLINIC | Facility: CLINIC | Age: 57
End: 2022-12-15

## 2022-12-15 DIAGNOSIS — M25.562 ARTHRALGIA OF BOTH KNEES: ICD-10-CM

## 2022-12-15 DIAGNOSIS — M25.561 ARTHRALGIA OF BOTH KNEES: ICD-10-CM

## 2022-12-15 RX ORDER — ESCITALOPRAM OXALATE 10 MG/1
10 TABLET ORAL DAILY
Qty: 30 TABLET | Refills: 2 | Status: SHIPPED | OUTPATIENT
Start: 2022-12-15 | End: 2022-12-15 | Stop reason: SDUPTHER

## 2022-12-15 RX ORDER — MELOXICAM 7.5 MG/1
7.5 TABLET ORAL DAILY
Qty: 90 TABLET | Refills: 3 | Status: SHIPPED | OUTPATIENT
Start: 2022-12-15 | End: 2022-12-20 | Stop reason: SDUPTHER

## 2022-12-15 RX ORDER — MELOXICAM 7.5 MG/1
7.5 TABLET ORAL DAILY
Qty: 90 TABLET | Refills: 3 | Status: SHIPPED | OUTPATIENT
Start: 2022-12-15 | End: 2022-12-15 | Stop reason: SDUPTHER

## 2022-12-15 RX ORDER — ESCITALOPRAM OXALATE 10 MG/1
10 TABLET ORAL DAILY
Qty: 30 TABLET | Refills: 2 | Status: SHIPPED | OUTPATIENT
Start: 2022-12-15 | End: 2022-12-20 | Stop reason: SDUPTHER

## 2022-12-20 DIAGNOSIS — M25.561 ARTHRALGIA OF BOTH KNEES: ICD-10-CM

## 2022-12-20 DIAGNOSIS — M25.562 ARTHRALGIA OF BOTH KNEES: ICD-10-CM

## 2022-12-20 RX ORDER — MELOXICAM 7.5 MG/1
7.5 TABLET ORAL DAILY
Qty: 90 TABLET | Refills: 3 | Status: SHIPPED | OUTPATIENT
Start: 2022-12-20

## 2022-12-20 RX ORDER — ESCITALOPRAM OXALATE 10 MG/1
10 TABLET ORAL DAILY
Qty: 90 TABLET | Refills: 1 | Status: SHIPPED | OUTPATIENT
Start: 2022-12-20

## 2023-01-31 RX ORDER — PANTOPRAZOLE SODIUM 40 MG/1
40 TABLET, DELAYED RELEASE ORAL DAILY
Qty: 90 TABLET | Refills: 3 | Status: SHIPPED | OUTPATIENT
Start: 2023-01-31 | End: 2023-01-31 | Stop reason: SDUPTHER

## 2023-01-31 RX ORDER — PANTOPRAZOLE SODIUM 40 MG/1
40 TABLET, DELAYED RELEASE ORAL DAILY
Qty: 90 TABLET | Refills: 3 | Status: SHIPPED | OUTPATIENT
Start: 2023-01-31 | End: 2023-02-17 | Stop reason: SDUPTHER

## 2023-02-17 RX ORDER — PANTOPRAZOLE SODIUM 40 MG/1
40 TABLET, DELAYED RELEASE ORAL DAILY
Qty: 90 TABLET | Refills: 3 | Status: SHIPPED | OUTPATIENT
Start: 2023-02-17

## 2023-03-10 ENCOUNTER — OFFICE VISIT (OUTPATIENT)
Dept: FAMILY MEDICINE CLINIC | Facility: CLINIC | Age: 58
End: 2023-03-10
Payer: COMMERCIAL

## 2023-03-10 ENCOUNTER — TELEPHONE (OUTPATIENT)
Dept: FAMILY MEDICINE CLINIC | Facility: CLINIC | Age: 58
End: 2023-03-10

## 2023-03-10 VITALS
TEMPERATURE: 97 F | HEART RATE: 76 BPM | SYSTOLIC BLOOD PRESSURE: 110 MMHG | DIASTOLIC BLOOD PRESSURE: 84 MMHG | WEIGHT: 181.2 LBS | HEIGHT: 69 IN | OXYGEN SATURATION: 97 % | BODY MASS INDEX: 26.84 KG/M2

## 2023-03-10 DIAGNOSIS — J02.9 SORE THROAT: ICD-10-CM

## 2023-03-10 DIAGNOSIS — U07.1 COVID-19: Primary | ICD-10-CM

## 2023-03-10 LAB
EXPIRATION DATE: ABNORMAL
EXPIRATION DATE: NORMAL
FLUAV AG UPPER RESP QL IA.RAPID: NOT DETECTED
FLUBV AG UPPER RESP QL IA.RAPID: NOT DETECTED
INTERNAL CONTROL: ABNORMAL
INTERNAL CONTROL: NORMAL
Lab: ABNORMAL
Lab: NORMAL
S PYO AG THROAT QL: NEGATIVE
SARS-COV-2 AG UPPER RESP QL IA.RAPID: DETECTED

## 2023-03-10 PROCEDURE — 87880 STREP A ASSAY W/OPTIC: CPT | Performed by: NURSE PRACTITIONER

## 2023-03-10 PROCEDURE — 99213 OFFICE O/P EST LOW 20 MIN: CPT | Performed by: NURSE PRACTITIONER

## 2023-03-10 PROCEDURE — 87428 SARSCOV & INF VIR A&B AG IA: CPT | Performed by: NURSE PRACTITIONER

## 2023-03-10 RX ORDER — DEXAMETHASONE 0.5 MG/1
TABLET ORAL
Qty: 21 TABLET | Refills: 0 | Status: SHIPPED | OUTPATIENT
Start: 2023-03-10

## 2023-03-10 NOTE — TELEPHONE ENCOUNTER
PATIENT STATES ON Sunday HE FELT A SORE NECK, SINUS PRESSURE, FEVERISH, ACHY.  ON Tuesday MORNING FEELING BETTER BUT STILL HAS SORE THROAT, TONSILS ALMOST BURNING, HURTS TO SWALLOW.  WORRIED IT MIGHT BE STREP THROAT.  WOULD LIKE MEDICATION FOR THIS.      PHARMACY:  [facility] [place]    PLEASE CALL [phone]

## 2023-03-10 NOTE — PROGRESS NOTES
"      Subjective     Chief Complaint:    Chief Complaint   Patient presents with   • Sore Throat   • Sinusitis   • Nasal Congestion       History of Present Illness:   Sick x 6 days, but started feeling worse on wednesday dizziness, sore rib cage, not too much nasal drainage, main complaint is pressure behind his eyes and light sensitivity. + sore throat.   No cough or shortness of breath  Has been using OTC sinus rinses   No fever  Is able to eat and drink   Off and on diarrhea    Review of Systems  As above      I have reviewed and/or updated the patient's past medical, surgical, family, social history and problem list as appropriate.     Medications:    Current Outpatient Medications:   •  Azelastine HCl 137 MCG/SPRAY solution, , Disp: , Rfl:   •  Cholecalciferol (VITAMIN D3 PO), , Disp: , Rfl:   •  clotrimazole-betamethasone (Lotrisone) 1-0.05 % cream, Apply  topically to the appropriate area as directed 2 (Two) Times a Day., Disp: 45 g, Rfl: 2  •  dexamethasone (DECADRON) 0.5 MG tablet, 6 po x1d; then 5 po x 1d; then 4 po x 1d; then 3 po x 1d; then 2 po x 1d; then 1 po x1d; then STOP, Disp: 21 tablet, Rfl: 0  •  diphenhydrAMINE (BENADRYL) 25 mg capsule, , Disp: , Rfl:   •  escitalopram (LEXAPRO) 10 MG tablet, Take 1 tablet by mouth Daily., Disp: 90 tablet, Rfl: 1  •  meloxicam (Mobic) 7.5 MG tablet, Take 1 tablet by mouth Daily., Disp: 90 tablet, Rfl: 3  •  Multiple Vitamin (MULTI VITAMIN DAILY PO), Take  by mouth., Disp: , Rfl:   •  pantoprazole (Protonix) 40 MG EC tablet, Take 1 tablet by mouth Daily., Disp: 90 tablet, Rfl: 3    Allergies:  No Known Allergies    Objective     Vital Signs:   Vitals:    03/10/23 1322   BP: 110/84   Pulse: 76   Temp: 97 °F (36.1 °C)   SpO2: 97%   Weight: 82.2 kg (181 lb 3.2 oz)   Height: 175.3 cm (69\")   PainSc: 0-No pain     Body mass index is 26.76 kg/m².    Physical Exam:    Physical Exam  Constitutional:       Appearance: He is well-developed.   HENT:      Head: " Normocephalic and atraumatic.      Right Ear: Tympanic membrane, ear canal and external ear normal.      Left Ear: Tympanic membrane, ear canal and external ear normal.      Nose: Nose normal.      Mouth/Throat:      Pharynx: Uvula midline.   Eyes:      Pupils: Pupils are equal, round, and reactive to light.   Cardiovascular:      Rate and Rhythm: Normal rate and regular rhythm.      Heart sounds: Normal heart sounds. No murmur heard.    No friction rub. No gallop.   Pulmonary:      Effort: Pulmonary effort is normal.      Breath sounds: Normal breath sounds.   Abdominal:      General: Bowel sounds are normal.      Palpations: Abdomen is soft.      Tenderness: There is no abdominal tenderness.   Musculoskeletal:      Cervical back: Neck supple.   Lymphadenopathy:      Head:      Right side of head: No submental, submandibular, tonsillar, preauricular or posterior auricular adenopathy.      Left side of head: No submental, submandibular, tonsillar, preauricular or posterior auricular adenopathy.      Cervical: No cervical adenopathy.   Skin:     General: Skin is warm and dry.      Capillary Refill: Capillary refill takes less than 2 seconds.   Neurological:      General: No focal deficit present.      Mental Status: He is alert and oriented to person, place, and time.   Psychiatric:         Mood and Affect: Mood normal.         Behavior: Behavior normal.         Assessment / Plan     Assessment/Plan:   Problem List Items Addressed This Visit    None  Visit Diagnoses     COVID-19    -  Primary    Relevant Medications    dexamethasone (DECADRON) 0.5 MG tablet    Sore throat        Relevant Orders    POCT rapid strep A (Completed)    POCT SARS-CoV-2 Antigen EMEKA + Flu (Completed)        -- covid positive, supportive care discussed, dex taper      Discussed plan of care in detail with pt today; pt verb understanding and agrees.    Follow up:  As needed    Electronically signed by DANILO Reilly   03/10/2023 13:39  EST      Please note that portions of this note were completed with a voice recognition program.

## 2023-05-23 ENCOUNTER — OFFICE VISIT (OUTPATIENT)
Dept: FAMILY MEDICINE CLINIC | Facility: CLINIC | Age: 58
End: 2023-05-23
Payer: COMMERCIAL

## 2023-05-23 VITALS
WEIGHT: 183.8 LBS | OXYGEN SATURATION: 97 % | HEIGHT: 70 IN | BODY MASS INDEX: 26.31 KG/M2 | HEART RATE: 63 BPM | DIASTOLIC BLOOD PRESSURE: 80 MMHG | SYSTOLIC BLOOD PRESSURE: 118 MMHG | TEMPERATURE: 97.7 F

## 2023-05-23 DIAGNOSIS — Z13.6 SCREENING FOR CARDIOVASCULAR CONDITION: ICD-10-CM

## 2023-05-23 DIAGNOSIS — Z00.00 ROUTINE GENERAL MEDICAL EXAMINATION AT A HEALTH CARE FACILITY: Primary | ICD-10-CM

## 2023-05-23 DIAGNOSIS — G25.3 MYOCLONIC JERKING WHILE SLEEPING: ICD-10-CM

## 2023-05-23 DIAGNOSIS — Z12.5 PROSTATE CANCER SCREENING: ICD-10-CM

## 2023-05-23 PROCEDURE — 99396 PREV VISIT EST AGE 40-64: CPT | Performed by: FAMILY MEDICINE

## 2023-05-23 RX ORDER — ZINC SULFATE 50(220)MG
220 CAPSULE ORAL DAILY
COMMUNITY

## 2023-05-23 RX ORDER — MULTIVIT WITH MINERALS/LUTEIN
250 TABLET ORAL DAILY
COMMUNITY

## 2023-05-23 NOTE — PROGRESS NOTES
Established Patient        Chief Complaint:   Chief Complaint   Patient presents with    Annual Exam     Pt stated recently he fell out of bed and jumped out of bed for no reason while asleep, pt wanted to address this during his visit today.        Carl Moraes is a 58 y.o. male    History of Present Illness:   Here today for an annual/preventative healthcare examination.  He maintains an active lifestyle, balanced dietary intake.  Reports good hydration habits.  Denies fever, chills or night sweats.  Denies aspiration/dysphagia.  No reports of BRB/BTS.  Maintains good urine output.    He does admit to past episodes of described myoclonic jerks, occurring while sleeping with a forceful movement at times, does awaken him at times.  No seizure-like activity.  Denies any syncopal episodes.    Subjective     The following portions of the patient's history were reviewed and updated as appropriate: allergies, current medications, past family history, past medical history, past social history, past surgical history and problem list.    No Known Allergies    Review of Systems  Constitutional: Negative for fever. Negative for chills, diaphoresis, fatigue and unexpected weight change.   HENT: No dysphagia; no changes to vision/hearing/smell/taste; no epistaxis.  Eyes: Negative for redness and visual disturbance.   Respiratory: negative for shortness of breath. Negative for chest pain . Negative for cough and chest tightness.   Cardiovascular: Negative for chest pain and palpitations.   Gastrointestinal: Negative for abdominal distention, abdominal pain and blood in stool.   Endocrine: Negative for cold intolerance and heat intolerance.   Genitourinary: Negative for difficulty urinating, dysuria and frequency.   Musculoskeletal: Negative for arthralgias, back pain and myalgias.   Skin: As per above.  Neurological: Negative for syncope, weakness and headaches.   Hematological: Negative for adenopathy. Does not bruise/bleed  "easily.   Psychiatric/Behavioral: Negative for confusion. The patient is not nervous/anxious.    Objective     Physical Exam   Vital Signs: /80   Pulse 63   Temp 97.7 °F (36.5 °C)   Ht 177.8 cm (70\")   Wt 83.4 kg (183 lb 12.8 oz)   SpO2 97%   BMI 26.37 kg/m²     General Appearance: alert, oriented x 3, no acute distress.  Skin: warm and dry.  Erythematous rash to the axilla, signs of mild excoriation.  HEENT: Atraumatic.  pupils round and reactive to light and accommodation, oral mucosa pink and moist.  Nares patent without epistaxis.  External auditory canals are patent tympanic membranes intact.   Neck: supple, no JVD, trachea midline.  No thyromegaly  Lungs: CTA, unlabored breathing effort.  Heart: RRR, normal S1 and S2, no S3, no rub.  Abdomen: soft, non-tender, no palpable bladder, present bowel sounds to auscultation ×4.  No guarding or rigidity.  Extremities: no clubbing, cyanosis or edema.  Good range of motion actively and passively.  Symmetric muscle strength and development  Neuro: normal speech and mental status.  Cranial nerves II through XII intact.  No anosmia. DTR 2+; proprioception intact.  No focal motor/sensory deficits.        Assessment and Plan      Assessment:   Diagnoses and all orders for this visit:    1. Routine general medical examination at a health care facility (Primary)  -     CBC & Differential  -     Comprehensive Metabolic Panel    2. Prostate cancer screening  -     PSA Screen    3. Screening for cardiovascular condition  -     Lipid Panel    4. Myoclonic jerking while sleeping        Plan:  I have discussed age/gender specific preventative healthcare issues in detail with patient today.  I have answered all of the questions.    Discussed need for reduction in sodium/salt/caffeine intake; improve sleep habits as able; inc formal CV exercise program with goal of vigorous activity most, if not all, days of the week; goal of 250 min of sustained HR CV exercise total per " week.    Discussed need for stress/anxiety reducing techniques such as prayer/meditation/breathing and counting exercises and avoidance of stress producing environments/situations; will follow clinically.    Vital signs demonstrate hemodynamic stability.  Surveillance labs today.    I have discussed the need for formal stretching program in the evening.  Maintain appropriate hydration status.  These should be of benefit aiding in controlling his myoclonic jerking during sleep.        Discussion Summary:    Discussed plan of care in detail with pt today; pt verb understanding and agrees.    I spent 30 minutes caring for Carl on this date of service. This time includes time spent by me in the following activities:preparing for the visit, performing a medically appropriate examination and/or evaluation , counseling and educating the patient/family/caregiver, ordering medications, tests, or procedures, documenting information in the medical record, and care coordination    I have reviewed and updated all copied forward information, as appropriate.  I attest to the accuracy and relevance of any unchanged information.    Follow up:  Return in about 1 year (around 5/23/2024) for Annual physical.     Patient Instructions   Preventive Care 40-64 Years Old, Male  Preventive care refers to lifestyle choices and visits with your health care provider that can promote health and wellness. Preventive care visits are also called wellness exams.  What can I expect for my preventive care visit?  Counseling  During your preventive care visit, your health care provider may ask about your:  Medical history, including:  Past medical problems.  Family medical history.  Current health, including:  Emotional well-being.  Home life and relationship well-being.  Sexual activity.  Lifestyle, including:  Alcohol, nicotine or tobacco, and drug use.  Access to firearms.  Diet, exercise, and sleep habits.  Safety issues such as seatbelt and bike  helmet use.  Sunscreen use.  Work and work environment.  Physical exam  Your health care provider will check your:  Height and weight. These may be used to calculate your BMI (body mass index). BMI is a measurement that tells if you are at a healthy weight.  Waist circumference. This measures the distance around your waistline. This measurement also tells if you are at a healthy weight and may help predict your risk of certain diseases, such as type 2 diabetes and high blood pressure.  Heart rate and blood pressure.  Body temperature.  Skin for abnormal spots.  What immunizations do I need?  Vaccines are usually given at various ages, according to a schedule. Your health care provider will recommend vaccines for you based on your age, medical history, and lifestyle or other factors, such as travel or where you work.  What tests do I need?  Screening  Your health care provider may recommend screening tests for certain conditions. This may include:  Lipid and cholesterol levels.  Diabetes screening. This is done by checking your blood sugar (glucose) after you have not eaten for a while (fasting).  Hepatitis B test.  Hepatitis C test.  HIV (human immunodeficiency virus) test.  STI (sexually transmitted infection) testing, if you are at risk.  Lung cancer screening.  Prostate cancer screening.  Colorectal cancer screening.  Talk with your health care provider about your test results, treatment options, and if necessary, the need for more tests.  Follow these instructions at home:  Eating and drinking    Eat a diet that includes fresh fruits and vegetables, whole grains, lean protein, and low-fat dairy products.  Take vitamin and mineral supplements as recommended by your health care provider.  Do not drink alcohol if your health care provider tells you not to drink.  If you drink alcohol:  Limit how much you have to 0-2 drinks a day.  Know how much alcohol is in your drink. In the [place] one drink equals one 12 oz  bottle of beer (355 mL), one 5 oz glass of wine (148 mL), or one 1½ oz glass of hard liquor (44 mL).  Lifestyle  Brush your teeth every morning and night with fluoride toothpaste. Floss one time each day.  Exercise for at least 30 minutes 5 or more days each week.  Do not use any products that contain nicotine or tobacco. These products include cigarettes, chewing tobacco, and vaping devices, such as e-cigarettes. If you need help quitting, ask your health care provider.  Do not use drugs.  If you are sexually active, practice safe sex. Use a condom or other form of protection to prevent STIs.  Take aspirin only as told by your health care provider. Make sure that you understand how much to take and what form to take. Work with your health care provider to find out whether it is safe and beneficial for you to take aspirin daily.  Find healthy ways to manage stress, such as:  Meditation, yoga, or listening to music.  Journaling.  Talking to a trusted person.  Spending time with friends and family.  Minimize exposure to UV radiation to reduce your risk of skin cancer.  Safety  Always wear your seat belt while driving or riding in a vehicle.  Do not drive:  If you have been drinking alcohol. Do not ride with someone who has been drinking.  When you are tired or distracted.  While texting.  If you have been using any mind-altering substances or drugs.  Wear a helmet and other protective equipment during sports activities.  If you have firearms in your house, make sure you follow all gun safety procedures.  What's next?  Go to your health care provider once a year for an annual wellness visit.  Ask your health care provider how often you should have your eyes and teeth checked.  Stay up to date on all vaccines.  This information is not intended to replace advice given to you by your health care provider. Make sure you discuss any questions you have with your health care provider.    Nicola Perry,   06/07/23  00:40  EDT

## 2023-05-24 LAB
ALBUMIN SERPL-MCNC: 4.5 G/DL (ref 3.5–5.2)
ALBUMIN/GLOB SERPL: 2 G/DL
ALP SERPL-CCNC: 63 U/L (ref 39–117)
ALT SERPL-CCNC: 55 U/L (ref 1–41)
AST SERPL-CCNC: 32 U/L (ref 1–40)
BASOPHILS # BLD AUTO: 0.04 10*3/MM3 (ref 0–0.2)
BASOPHILS NFR BLD AUTO: 1.6 % (ref 0–1.5)
BILIRUB SERPL-MCNC: 0.8 MG/DL (ref 0–1.2)
BUN SERPL-MCNC: 18 MG/DL (ref 6–20)
BUN/CREAT SERPL: 14.9 (ref 7–25)
CALCIUM SERPL-MCNC: 9.6 MG/DL (ref 8.6–10.5)
CHLORIDE SERPL-SCNC: 106 MMOL/L (ref 98–107)
CHOLEST SERPL-MCNC: 195 MG/DL (ref 0–200)
CO2 SERPL-SCNC: 27.9 MMOL/L (ref 22–29)
CREAT SERPL-MCNC: 1.21 MG/DL (ref 0.76–1.27)
EGFRCR SERPLBLD CKD-EPI 2021: 69 ML/MIN/1.73
EOSINOPHIL # BLD AUTO: 0.08 10*3/MM3 (ref 0–0.4)
EOSINOPHIL NFR BLD AUTO: 3.1 % (ref 0.3–6.2)
ERYTHROCYTE [DISTWIDTH] IN BLOOD BY AUTOMATED COUNT: 13.5 % (ref 12.3–15.4)
GLOBULIN SER CALC-MCNC: 2.2 GM/DL
GLUCOSE SERPL-MCNC: 99 MG/DL (ref 65–99)
HCT VFR BLD AUTO: 46.7 % (ref 37.5–51)
HDLC SERPL-MCNC: 45 MG/DL (ref 40–60)
HGB BLD-MCNC: 15.7 G/DL (ref 13–17.7)
IMM GRANULOCYTES # BLD AUTO: 0.01 10*3/MM3 (ref 0–0.05)
IMM GRANULOCYTES NFR BLD AUTO: 0.4 % (ref 0–0.5)
LDLC SERPL CALC-MCNC: 133 MG/DL (ref 0–100)
LYMPHOCYTES # BLD AUTO: 1.05 10*3/MM3 (ref 0.7–3.1)
LYMPHOCYTES NFR BLD AUTO: 41.2 % (ref 19.6–45.3)
MCH RBC QN AUTO: 29.9 PG (ref 26.6–33)
MCHC RBC AUTO-ENTMCNC: 33.6 G/DL (ref 31.5–35.7)
MCV RBC AUTO: 89 FL (ref 79–97)
MONOCYTES # BLD AUTO: 0.25 10*3/MM3 (ref 0.1–0.9)
MONOCYTES NFR BLD AUTO: 9.8 % (ref 5–12)
NEUTROPHILS # BLD AUTO: 1.12 10*3/MM3 (ref 1.7–7)
NEUTROPHILS NFR BLD AUTO: 43.9 % (ref 42.7–76)
NRBC BLD AUTO-RTO: 0 /100 WBC (ref 0–0.2)
PLATELET # BLD AUTO: 176 10*3/MM3 (ref 140–450)
POTASSIUM SERPL-SCNC: 4.9 MMOL/L (ref 3.5–5.2)
PROT SERPL-MCNC: 6.7 G/DL (ref 6–8.5)
PSA SERPL-MCNC: 0.62 NG/ML (ref 0–4)
RBC # BLD AUTO: 5.25 10*6/MM3 (ref 4.14–5.8)
SODIUM SERPL-SCNC: 140 MMOL/L (ref 136–145)
TRIGL SERPL-MCNC: 94 MG/DL (ref 0–150)
VLDLC SERPL CALC-MCNC: 17 MG/DL (ref 5–40)
WBC # BLD AUTO: 2.55 10*3/MM3 (ref 3.4–10.8)

## 2023-06-07 PROBLEM — G25.3 MYOCLONIC JERKING WHILE SLEEPING: Status: ACTIVE | Noted: 2023-06-07

## 2023-06-12 ENCOUNTER — TELEPHONE (OUTPATIENT)
Dept: FAMILY MEDICINE CLINIC | Facility: CLINIC | Age: 58
End: 2023-06-12
Payer: COMMERCIAL

## 2023-06-12 DIAGNOSIS — M25.561 ARTHRALGIA OF BOTH KNEES: ICD-10-CM

## 2023-06-12 DIAGNOSIS — F41.1 GENERALIZED ANXIETY DISORDER: Primary | ICD-10-CM

## 2023-06-12 DIAGNOSIS — M25.562 ARTHRALGIA OF BOTH KNEES: ICD-10-CM

## 2023-06-12 RX ORDER — ESCITALOPRAM OXALATE 10 MG/1
10 TABLET ORAL DAILY
Qty: 90 TABLET | Refills: 1 | Status: SHIPPED | OUTPATIENT
Start: 2023-06-12

## 2023-06-12 RX ORDER — MELOXICAM 7.5 MG/1
7.5 TABLET ORAL DAILY
Qty: 90 TABLET | Refills: 3 | Status: SHIPPED | OUTPATIENT
Start: 2023-06-12

## 2023-06-12 NOTE — TELEPHONE ENCOUNTER
Caller: Carl Moraes    Relationship: Self    Best call back number: [phone]    Requested Prescriptions:   Requested Prescriptions      No prescriptions requested or ordered in this encounter        escitalopram (LEXAPRO) 10 MG tablet     meloxicam (Mobic) 7.5 MG tablet     Pharmacy where request should be sent:      [facility] - [address] [facility] [address] [facility] [address] - [phone] [facility] [phone]      Last office visit with prescribing clinician: 5/23/2023   Last telemedicine visit with prescribing clinician: Visit date not found   Next office visit with prescribing clinician: 5/24/2024     Additional details provided by patient:     NPI NUMBER - [id number]    Does the patient have less than a 3 day supply:  [] Yes  [x] No    Would you like a call back once the refill request has been completed: [] Yes [x] No    If the office needs to give you a call back, can they leave a voicemail: [] Yes [x] No    María Barajas, PCT   06/12/23 16:07 EDT

## 2024-02-02 NOTE — PATIENT INSTRUCTIONS
Preventive Care 40-64 Years Old, Male  Preventive care refers to lifestyle choices and visits with your health care provider that can promote health and wellness. Preventive care visits are also called wellness exams.  What can I expect for my preventive care visit?  Counseling  During your preventive care visit, your health care provider may ask about your:  Medical history, including:  Past medical problems.  Family medical history.  Current health, including:  Emotional well-being.  Home life and relationship well-being.  Sexual activity.  Lifestyle, including:  Alcohol, nicotine or tobacco, and drug use.  Access to firearms.  Diet, exercise, and sleep habits.  Safety issues such as seatbelt and bike helmet use.  Sunscreen use.  Work and work environment.  Physical exam  Your health care provider will check your:  Height and weight. These may be used to calculate your BMI (body mass index). BMI is a measurement that tells if you are at a healthy weight.  Waist circumference. This measures the distance around your waistline. This measurement also tells if you are at a healthy weight and may help predict your risk of certain diseases, such as type 2 diabetes and high blood pressure.  Heart rate and blood pressure.  Body temperature.  Skin for abnormal spots.  What immunizations do I need?  Vaccines are usually given at various ages, according to a schedule. Your health care provider will recommend vaccines for you based on your age, medical history, and lifestyle or other factors, such as travel or where you work.  What tests do I need?  Screening  Your health care provider may recommend screening tests for certain conditions. This may include:  Lipid and cholesterol levels.  Diabetes screening. This is done by checking your blood sugar (glucose) after you have not eaten for a while (fasting).  Hepatitis B test.  Hepatitis C test.  HIV (human immunodeficiency virus) test.  STI (sexually transmitted infection)  Spoke to pt, she states she was informed of this by the dentist and plans to discuss with her pcp at the new pt appt 2/9/24. She declines orders for lab and US at this time. Routed to Yoshi Porter MD as an FYI.   testing, if you are at risk.  Lung cancer screening.  Prostate cancer screening.  Colorectal cancer screening.  Talk with your health care provider about your test results, treatment options, and if necessary, the need for more tests.  Follow these instructions at home:  Eating and drinking    Eat a diet that includes fresh fruits and vegetables, whole grains, lean protein, and low-fat dairy products.  Take vitamin and mineral supplements as recommended by your health care provider.  Do not drink alcohol if your health care provider tells you not to drink.  If you drink alcohol:  Limit how much you have to 0-2 drinks a day.  Know how much alcohol is in your drink. In the [place] one drink equals one 12 oz bottle of beer (355 mL), one 5 oz glass of wine (148 mL), or one 1½ oz glass of hard liquor (44 mL).  Lifestyle  Brush your teeth every morning and night with fluoride toothpaste. Floss one time each day.  Exercise for at least 30 minutes 5 or more days each week.  Do not use any products that contain nicotine or tobacco. These products include cigarettes, chewing tobacco, and vaping devices, such as e-cigarettes. If you need help quitting, ask your health care provider.  Do not use drugs.  If you are sexually active, practice safe sex. Use a condom or other form of protection to prevent STIs.  Take aspirin only as told by your health care provider. Make sure that you understand how much to take and what form to take. Work with your health care provider to find out whether it is safe and beneficial for you to take aspirin daily.  Find healthy ways to manage stress, such as:  Meditation, yoga, or listening to music.  Journaling.  Talking to a trusted person.  Spending time with friends and family.  Minimize exposure to UV radiation to reduce your risk of skin cancer.  Safety  Always wear your seat belt while driving or riding in a vehicle.  Do not drive:  If you have been drinking alcohol. Do not ride with someone who  has been drinking.  When you are tired or distracted.  While texting.  If you have been using any mind-altering substances or drugs.  Wear a helmet and other protective equipment during sports activities.  If you have firearms in your house, make sure you follow all gun safety procedures.  What's next?  Go to your health care provider once a year for an annual wellness visit.  Ask your health care provider how often you should have your eyes and teeth checked.  Stay up to date on all vaccines.  This information is not intended to replace advice given to you by your health care provider. Make sure you discuss any questions you have with your health care provider.

## 2024-02-28 DIAGNOSIS — M25.562 ARTHRALGIA OF BOTH KNEES: ICD-10-CM

## 2024-02-28 DIAGNOSIS — M25.561 ARTHRALGIA OF BOTH KNEES: ICD-10-CM

## 2024-02-28 DIAGNOSIS — F41.1 GENERALIZED ANXIETY DISORDER: ICD-10-CM

## 2024-02-28 NOTE — TELEPHONE ENCOUNTER
Caller: Dimitry Carl    Relationship: Self    Best call back number: [phone]     Requested Prescriptions:   Requested Prescriptions     Pending Prescriptions Disp Refills    pantoprazole (Protonix) 40 MG EC tablet 90 tablet 3     Sig: Take 1 tablet by mouth Daily.    escitalopram (LEXAPRO) 10 MG tablet 90 tablet 1     Sig: Take 1 tablet by mouth Daily.        Pharmacy where request should be sent: [facility]  [facility]  [address] [facility] [facility] [address] - [phone] [facility] [phone] FX     Last office visit with prescribing clinician: 5/23/2023   Last telemedicine visit with prescribing clinician: Visit date not found   Next office visit with prescribing clinician: 5/24/2024     Additional details provided by patient:     Does the patient have less than a 3 day supply:  [] Yes  [x] No    Would you like a call back once the refill request has been completed: [] Yes [x] No    If the office needs to give you a call back, can they leave a voicemail: [] Yes [x] No    Servando Lind, PCT   02/28/24 10:05 EST

## 2024-02-29 RX ORDER — ESCITALOPRAM OXALATE 10 MG/1
10 TABLET ORAL DAILY
Qty: 90 TABLET | Refills: 1 | Status: SHIPPED | OUTPATIENT
Start: 2024-02-29

## 2024-02-29 RX ORDER — PANTOPRAZOLE SODIUM 40 MG/1
40 TABLET, DELAYED RELEASE ORAL DAILY
Qty: 90 TABLET | Refills: 3 | Status: SHIPPED | OUTPATIENT
Start: 2024-02-29

## 2024-03-15 ENCOUNTER — TELEPHONE (OUTPATIENT)
Dept: SURGERY | Facility: CLINIC | Age: 59
End: 2024-03-15
Payer: COMMERCIAL

## 2024-03-27 ENCOUNTER — PREP FOR SURGERY (OUTPATIENT)
Dept: OTHER | Facility: HOSPITAL | Age: 59
End: 2024-03-27
Payer: COMMERCIAL

## 2024-03-27 ENCOUNTER — TELEPHONE (OUTPATIENT)
Dept: SURGERY | Facility: CLINIC | Age: 59
End: 2024-03-27
Payer: COMMERCIAL

## 2024-03-27 DIAGNOSIS — Z12.11 ENCOUNTER FOR COLONOSCOPY DUE TO HISTORY OF ADENOMATOUS COLONIC POLYPS: Primary | ICD-10-CM

## 2024-03-27 DIAGNOSIS — Z86.010 ENCOUNTER FOR COLONOSCOPY DUE TO HISTORY OF ADENOMATOUS COLONIC POLYPS: Primary | ICD-10-CM

## 2024-03-27 PROBLEM — Z86.0101 ENCOUNTER FOR COLONOSCOPY DUE TO HISTORY OF ADENOMATOUS COLONIC POLYPS: Status: ACTIVE | Noted: 2024-03-27

## 2024-03-27 RX ORDER — POLYETHYLENE GLYCOL 3350 17 G/17G
POWDER, FOR SOLUTION ORAL
Qty: 238 G | Refills: 0 | Status: SHIPPED | OUTPATIENT
Start: 2024-03-27

## 2024-03-27 RX ORDER — BISACODYL 5 MG/1
TABLET, DELAYED RELEASE ORAL
Qty: 4 TABLET | Refills: 0 | Status: SHIPPED | OUTPATIENT
Start: 2024-03-27

## 2024-03-28 ENCOUNTER — TELEPHONE (OUTPATIENT)
Dept: SURGERY | Facility: CLINIC | Age: 59
End: 2024-03-28
Payer: COMMERCIAL

## 2024-05-16 NOTE — PRE-PROCEDURE INSTRUCTIONS
PAT phone history completed with patient for upcoming procedure on 5/22/24.    PAT PASS reviewed with patient and he/she verbalized understanding of the following:     Do not eat or drink anything after midnight the night before procedure unless otherwise instructed by physician/surgeon's office, this includes no gum, candy, mints, tobacco products or e-cigarettes.  Do not shave the area to be operated on at least 48 hours prior to procedure.  Do not wear makeup, lotion, hair products, or nail polish.  Do not wear any jewelry and remove all piercings.  Do not wear any adhesive if you wear dentures.  Do not wear contacts; bring in glasses if needed.  Only take medications on the morning of procedure as instructed by PAT nurse per anesthesia guidelines or as instructed by physician's office.   If you are on any blood thinners reach out to the physician/surgeon's office for instructions on when/if they will need to be stopped prior to procedure.   Bring in picture ID and insurance card, advanced directive copies if applicable, CPAP/BIPAP/Inhalers if indicated morning of procedure, leave any other valuables at home.  Ensure you have arranged for someone to drive you home the day of your procedure and someone to care for you at home afterwards. It is recommended that you do not drive, drink alcohol, or make any major legal decisions for at least 24 hours after your procedure is complete.    Instructions given on hospital entrance and registration location.

## 2024-05-22 ENCOUNTER — HOSPITAL ENCOUNTER (OUTPATIENT)
Facility: HOSPITAL | Age: 59
Setting detail: HOSPITAL OUTPATIENT SURGERY
Discharge: HOME | End: 2024-05-22
Attending: SURGERY | Admitting: SURGERY
Payer: COMMERCIAL

## 2024-05-22 ENCOUNTER — ANESTHESIA EVENT (OUTPATIENT)
Dept: GASTROENTEROLOGY | Facility: HOSPITAL | Age: 59
End: 2024-05-22
Payer: COMMERCIAL

## 2024-05-22 ENCOUNTER — ANESTHESIA (OUTPATIENT)
Dept: GASTROENTEROLOGY | Facility: HOSPITAL | Age: 59
End: 2024-05-22
Payer: COMMERCIAL

## 2024-05-22 VITALS
HEART RATE: 53 BPM | WEIGHT: 180 LBS | OXYGEN SATURATION: 98 % | RESPIRATION RATE: 18 BRPM | TEMPERATURE: 97.8 F | DIASTOLIC BLOOD PRESSURE: 88 MMHG | BODY MASS INDEX: 25.83 KG/M2 | SYSTOLIC BLOOD PRESSURE: 124 MMHG

## 2024-05-22 PROCEDURE — 25010000002 PROPOFOL 10 MG/ML EMULSION: Performed by: NURSE ANESTHETIST, CERTIFIED REGISTERED

## 2024-05-22 PROCEDURE — S0260 H&P FOR SURGERY: HCPCS | Performed by: SURGERY

## 2024-05-22 PROCEDURE — 45378 DIAGNOSTIC COLONOSCOPY: CPT | Performed by: SURGERY

## 2024-05-22 PROCEDURE — 25810000003 LACTATED RINGERS PER 1000 ML: Performed by: SURGERY

## 2024-05-22 RX ORDER — SIMETHICONE 40MG/0.6ML
SUSPENSION, DROPS(FINAL DOSAGE FORM)(ML) ORAL AS NEEDED
Status: DISCONTINUED | OUTPATIENT
Start: 2024-05-22 | End: 2024-05-22 | Stop reason: HOSPADM

## 2024-05-22 RX ORDER — ONDANSETRON 2 MG/ML
4 INJECTION INTRAMUSCULAR; INTRAVENOUS ONCE AS NEEDED
Status: DISCONTINUED | OUTPATIENT
Start: 2024-05-22 | End: 2024-05-22 | Stop reason: HOSPADM

## 2024-05-22 RX ORDER — SODIUM CHLORIDE, SODIUM LACTATE, POTASSIUM CHLORIDE, CALCIUM CHLORIDE 600; 310; 30; 20 MG/100ML; MG/100ML; MG/100ML; MG/100ML
1000 INJECTION, SOLUTION INTRAVENOUS CONTINUOUS
Status: DISCONTINUED | OUTPATIENT
Start: 2024-05-22 | End: 2024-05-22 | Stop reason: HOSPADM

## 2024-05-22 RX ORDER — PROPOFOL 10 MG/ML
VIAL (ML) INTRAVENOUS CONTINUOUS PRN
Status: DISCONTINUED | OUTPATIENT
Start: 2024-05-22 | End: 2024-05-22 | Stop reason: SURG

## 2024-05-22 RX ADMIN — PROPOFOL 200 MCG/KG/MIN: 10 INJECTION, EMULSION INTRAVENOUS at 07:45

## 2024-05-22 RX ADMIN — SODIUM CHLORIDE, POTASSIUM CHLORIDE, SODIUM LACTATE AND CALCIUM CHLORIDE 1000 ML: 600; 310; 30; 20 INJECTION, SOLUTION INTRAVENOUS at 06:32

## 2024-05-22 RX ADMIN — LIDOCAINE HYDROCHLORIDE 60 MG: 20 INJECTION, SOLUTION INTRAVENOUS at 07:45

## 2024-05-22 NOTE — ANESTHESIA PREPROCEDURE EVALUATION
Anesthesia Evaluation     Patient summary reviewed and Nursing notes reviewed   NPO Solid Status: > 8 hours  NPO Liquid Status: > 8 hours           Airway   Mallampati: I  TM distance: >3 FB  Neck ROM: full  No difficulty expected  Dental - normal exam     Pulmonary - negative pulmonary ROS    breath sounds clear to auscultation  Cardiovascular - negative cardio ROS    Rhythm: regular  Rate: normal        Neuro/Psych- negative ROS  (+) headaches, psychiatric history Anxiety  GI/Hepatic/Renal/Endo - negative ROS   (+) GERD, renal disease- stones    Musculoskeletal (-) negative ROS    Abdominal    Substance History - negative use     OB/GYN negative ob/gyn ROS         Other                          Anesthesia Plan    ASA 2     MAC     intravenous induction     Anesthetic plan, risks, benefits, and alternatives have been provided, discussed and informed consent has been obtained with: patient.  Pre-procedure education provided  Plan discussed with CRNA.        CODE STATUS:

## 2024-05-22 NOTE — H&P
[facility]   HISTORY AND PHYSICAL      Name:  Carl Moraes   Age:  59 y.o.  Sex:  male  :  1965  MRN:  [id number]   Visit Number:  [id number]  Admission Date:  2024  Date Of Service:  24  Primary Care Physician:  Nicola Perry DO    Chief Complaint:     I need a colonoscopy    History Of Presenting Illness:      Patient here for screening colonoscopy.  Has a history of adenomatous colon polyp.  No GI complaints.    Review Of Systems:     General ROS: Patient denies any fevers, chills or loss of consciousness.  No complaints of generalized weakness  Psychological ROS: Denies any hallucinations and delusions.  Respiratory ROS: Denies cough or shortness of breath.   Cardiovascular ROS: Denies chest pain or palpitations. No history of exertional chest pain.   Gastrointestinal ROS: Denies nausea and vomiting. Denies any abdominal pain. No diarrhea.   Genito-Urinary ROS: Denies dysuria or hematuria.  Neurological ROS: Denies any focal weakness. No loss of consciousness. Denies any numbness.   Dermatological ROS: Denies any redness or pruritis.     Past Medical History:    Past Medical History:   Diagnosis Date    Allergic 2021    Colon polyp 2019    Diverticulosis 2019    GERD (gastroesophageal reflux disease)     Kidney stone     history of kidney stones    Migraine headache     Wears glasses        Past Surgical history:    Past Surgical History:   Procedure Laterality Date    COLONOSCOPY      ROTATOR CUFF REPAIR Right     URETEROSCOPY LASER LITHOTRIPSY WITH STENT INSERTION Right 2020    Procedure: CYSTOSCOPY, RIGHT URETEROSCOPY LASER LITHOTRIPSY WITH STENT INSERTION RIGHT; RIGHT RETROGRADE; BASKET RETRIEVAL OF STONE;  Surgeon: Carlo Delgado MD;  [facility];  Service: Urology;  Laterality: Right;       Social History:    Social History     Socioeconomic History    Marital status:    Tobacco Use    Smoking  status: Never    Smokeless tobacco: Never   Vaping Use    Vaping status: Never Used   Substance and Sexual Activity    Alcohol use: Yes     Comment: occasionally    Drug use: No    Sexual activity: Defer       Family History:    Family History   Problem Relation Age of Onset    Cancer Paternal Uncle     Cancer Paternal Grandmother         Breast cancer    Asthma Mother     Cancer Mother         Small Cell    Kidney disease Mother         CKD    Stroke Father         2011    Other Father         Dementia       Allergies:      Patient has no known allergies.    Home Medications:    Prior to Admission Medications       Prescriptions Last Dose Informant Patient Reported? Taking?    Azelastine HCl 137 MCG/SPRAY solution Past Week  Yes Yes    bisacodyl (Dulcolax) 5 MG EC tablet 5/21/2024  No Yes    Use as directed in bowel prep instructions.    Cholecalciferol (VITAMIN D3 PO) 5/21/2024  Yes Yes    Daily.    clotrimazole-betamethasone (Lotrisone) 1-0.05 % cream   No No    Apply  topically to the appropriate area as directed 2 (Two) Times a Day.    dexamethasone (DECADRON) 0.5 MG tablet   No No    6 po x1d; then 5 po x 1d; then 4 po x 1d; then 3 po x 1d; then 2 po x 1d; then 1 po x1d; then STOP    diphenhydrAMINE (BENADRYL) 25 mg capsule 5/21/2024  Yes Yes    At Night As Needed.    escitalopram (LEXAPRO) 10 MG tablet 5/21/2024  No Yes    Take 1 tablet by mouth Daily.    Flaxseed, Linseed, (FLAX SEED OIL PO) 5/21/2024  Yes Yes    Take  by mouth Daily.    Glucos-Chond-Hyal Ac-Ca Fructo (MOVE FREE JOINT HEALTH ADVANCE PO) 5/21/2024 Self Yes Yes    Take  by mouth.    meloxicam (Mobic) 7.5 MG tablet 5/21/2024  No Yes    Take 1 tablet by mouth Daily.    Multiple Vitamin (MULTI VITAMIN DAILY PO) 5/21/2024  Yes Yes    Take  by mouth.    pantoprazole (Protonix) 40 MG EC tablet 5/21/2024  No Yes    Take 1 tablet by mouth Daily.    polyethylene glycol (MiraLax) 17 GM/SCOOP powder 5/21/2024  No Yes    Mix 238g powder with 64 oz of  clear liquid. Use as directed    vitamin C (ASCORBIC ACID) 250 MG tablet 5/21/2024  Yes Yes    Take 4 tablets by mouth Daily.    zinc sulfate (ZINCATE) 220 (50 Zn) MG capsule 5/21/2024  Yes Yes    Take 1 capsule by mouth Daily.               ED Medications:    Medications   lactated ringers infusion 1,000 mL ( Intravenous Currently Infusing 5/22/24 0740)       Vital Signs:    Temp:  [97 °F (36.1 °C)] 97 °F (36.1 °C)  Heart Rate:  [66] 66  Resp:  [18] 18  BP: (109)/(93) 109/93        05/16/24  1543   Weight: 81.6 kg (180 lb)       Body mass index is 25.83 kg/m².    Physical Exam:      General Appearance:  Alert and cooperative, not in any acute distress.   Head:  Atraumatic and normocephalic, without obvious abnormality.   Eyes:          PERRLA, conjunctivae and sclerae normal, no Icterus. No pallor. Extra-occular movements are within normal limits.   Ears:  Ears appear intact with no abnormalities noted.   Respiratory/Lungs:   Breath sounds heard bilaterally equally.  No crackles or wheezing. No Pleural rub or bronchial breathing. Normal respiratory effort.    Cardiovascular/Heart:  Normal S1 and S2,    GI/Abdomen:   No masses, no hepatosplenomegaly. Soft, non-tender, non-distended, no hernia                 Musculoskeletal/ Extremities:   Moves all extremities well   Skin: No bleeding, bruising or rash, no induration   Psychiatric : Alert and oriented ×3.  No depression or anxiety    Neurologic: Cranial nerves 2 - 12 grossly intact, sensation intact, Motor power is normal and equal bilaterally.       EKG:          Labs:    Lab Results (last 24 hours)       ** No results found for the last 24 hours. **            Radiology:    Imaging Results (Last 72 Hours)       ** No results found for the last 72 hours. **            Assessment:    Screening colonoscopy with history of adenomatous colon polyp.  Last colonoscopy 5 years ago.    Plan:     I recommend a screening colonoscopy to the patient.  The patient understands  the procedure and the reason for the procedure.  The patient also understands the risks which include but are not limited to bleeding and perforation and they understand the ramifications of these potential complications including operative intervention and wish to proceed.    Ramirez Cantu MD  05/22/24  07:44 EDT

## 2024-05-22 NOTE — ANESTHESIA POSTPROCEDURE EVALUATION
Patient: Carl Moraes    Procedure Summary       Date: 05/22/24 Room / Location: [facility] ENDOSCOPY 1 / [facility] ENDOSCOPY    Anesthesia Start: 0740 Anesthesia Stop: 0801    Procedure: COLONOSCOPY (Anus) Diagnosis:       Encounter for colonoscopy due to history of adenomatous colonic polyps      (Encounter for colonoscopy due to history of adenomatous colonic polyps [Z12.11, Z86.010])    Surgeons: Ramirez Cantu MD Provider: Kanu Weber CRNA    Anesthesia Type: MAC ASA Status: 2            Anesthesia Type: MAC    Vitals  No vitals data found for the desired time range.          Post Anesthesia Care and Evaluation    Patient location during evaluation: PHASE II  Patient participation: complete - patient participated  Level of consciousness: awake and alert  Pain score: 0  Pain management: satisfactory to patient    Airway patency: patent  Anesthetic complications: No anesthetic complications  PONV Status: none  Cardiovascular status: acceptable and stable  Respiratory status: acceptable and spontaneous ventilation  Hydration status: acceptable    Comments: Vitals signs as noted in nursing documentation as per protocol.

## 2024-05-24 ENCOUNTER — OFFICE VISIT (OUTPATIENT)
Dept: FAMILY MEDICINE CLINIC | Facility: CLINIC | Age: 59
End: 2024-05-24
Payer: COMMERCIAL

## 2024-05-24 VITALS
BODY MASS INDEX: 25.43 KG/M2 | SYSTOLIC BLOOD PRESSURE: 106 MMHG | OXYGEN SATURATION: 98 % | HEART RATE: 61 BPM | DIASTOLIC BLOOD PRESSURE: 78 MMHG | WEIGHT: 177.6 LBS | HEIGHT: 70 IN

## 2024-05-24 DIAGNOSIS — Z13.6 SCREENING FOR CARDIOVASCULAR CONDITION: ICD-10-CM

## 2024-05-24 DIAGNOSIS — F41.1 GENERALIZED ANXIETY DISORDER: ICD-10-CM

## 2024-05-24 DIAGNOSIS — Z12.5 PROSTATE CANCER SCREENING: ICD-10-CM

## 2024-05-24 DIAGNOSIS — E78.5 DYSLIPIDEMIA: ICD-10-CM

## 2024-05-24 DIAGNOSIS — M25.562 ARTHRALGIA OF BOTH KNEES: ICD-10-CM

## 2024-05-24 DIAGNOSIS — K21.9 GERD WITHOUT ESOPHAGITIS: ICD-10-CM

## 2024-05-24 DIAGNOSIS — Z00.00 ROUTINE GENERAL MEDICAL EXAMINATION AT A HEALTH CARE FACILITY: Primary | ICD-10-CM

## 2024-05-24 DIAGNOSIS — M25.561 ARTHRALGIA OF BOTH KNEES: ICD-10-CM

## 2024-05-24 PROCEDURE — 99396 PREV VISIT EST AGE 40-64: CPT | Performed by: FAMILY MEDICINE

## 2024-05-24 RX ORDER — PANTOPRAZOLE SODIUM 40 MG/1
40 TABLET, DELAYED RELEASE ORAL DAILY
Qty: 90 TABLET | Refills: 3 | Status: SHIPPED | OUTPATIENT
Start: 2024-05-24

## 2024-05-24 RX ORDER — MELOXICAM 7.5 MG/1
7.5 TABLET ORAL DAILY
Qty: 90 TABLET | Refills: 3 | Status: SHIPPED | OUTPATIENT
Start: 2024-05-24

## 2024-05-24 RX ORDER — ESCITALOPRAM OXALATE 10 MG/1
10 TABLET ORAL DAILY
Qty: 90 TABLET | Refills: 3 | Status: SHIPPED | OUTPATIENT
Start: 2024-05-24

## 2024-05-24 NOTE — PROGRESS NOTES
Established Patient        Chief Complaint:   Chief Complaint   Patient presents with    Annual Exam     Pt has no concerns at this time.        Carl Moraes is a 59 y.o. male    History of Present Illness:   Here today for an annual/preventative healthcare examination.  Also here in follow-up of his GERD, dyslipidemia and generalized anxiety disorder    Denies chest pain, syncope, palpitations or vertigo.  Denies fever, chills or night sweats.  Maintains an active lifestyle, balanced dietary intake; reports good hydration habits.  Denies hematuria/dysuria.  Denies any BRB/BTS.  No new rashes.  Denies orthopnea, epistaxis or hemoptysis.    Patient is status post endoscopy.  Normal colonoscopy.    Subjective     The following portions of the patient's history were reviewed and updated as appropriate: allergies, current medications, past family history, past medical history, past social history, past surgical history and problem list.    No Known Allergies    Review of Systems  Constitutional: Negative for fever. Negative for chills, diaphoresis, fatigue and unexpected weight change.   HENT: No dysphagia; no changes to vision/hearing/smell/taste; no epistaxis  Eyes: Negative for redness and visual disturbance.   Respiratory: negative for shortness of breath. Negative for chest pain . Negative for cough and chest tightness.   Cardiovascular: Negative for chest pain and palpitations.   Gastrointestinal: Negative for abdominal distention, abdominal pain and blood in stool.   Endocrine: Negative for cold intolerance and heat intolerance.   Genitourinary: Negative for difficulty urinating, dysuria and frequency.   Musculoskeletal: Negative for arthralgias, back pain and myalgias.   Skin: Negative for color change, rash and wound.   Neurological: Negative for syncope, weakness and headaches.   Hematological: Negative for adenopathy. Does not bruise/bleed easily.   Psychiatric/Behavioral: Negative  "for confusion. The patient is not nervous/anxious.    Objective     Physical Exam   Vital Signs: /78   Pulse 61   Ht 177.8 cm (70\")   Wt 80.6 kg (177 lb 9.6 oz)   SpO2 98%   BMI 25.48 kg/m²   BMI is >= 25 and <30. (Overweight) The following options were offered after discussion;: none (medical contraindication)      General Appearance: alert, oriented x 3, no acute distress.  Skin: warm and dry.   HEENT: Atraumatic.  pupils round and reactive to light and accommodation, oral mucosa pink and moist.  Nares patent without epistaxis.  External auditory canals are patent tympanic membranes intact.  Neck: supple, no JVD, trachea midline.  No thyromegaly  Lungs: CTA, unlabored breathing effort.  Heart: RRR, normal S1 and S2, no S3, no rub.  Abdomen: soft, non-tender, no palpable bladder, present bowel sounds to auscultation ×4.  No guarding or rigidity.  Extremities: no clubbing, cyanosis or edema.  Good range of motion actively and passively.  Symmetric muscle strength and development  Neuro: normal speech and mental status.  Cranial nerves II through XII intact.  No anosmia. DTR 2+; proprioception intact.  No focal motor/sensory deficits.    Advance Care Planning   ACP discussion was held with the patient during this visit. Patient does not have an advance directive, information provided.       Assessment and Plan      Assessment/Plan:   Diagnoses and all orders for this visit:    1. Routine general medical examination at a health care facility (Primary)    2. Prostate cancer screening  -     PSA SCREENING    3. Screening for cardiovascular condition  -     Comprehensive Metabolic Panel  -     Lipid Panel  -     CBC & Differential    4. GERD without esophagitis  -     CBC & Differential    5. Generalized anxiety disorder    6. Dyslipidemia  -     Comprehensive Metabolic Panel  -     Lipid Panel      Normal colonoscopy with Dr. Cantu.    I have discussed age/gender specific preventative healthcare issues in " detail with patient today.  I have answered all of the questions.    Discussed need for reduction in sodium/salt/caffeine intake; improve sleep habits as able; inc formal CV exercise program with goal of vigorous activity most, if not all, days of the week; goal of 150 min of sustained HR CV exercise total per week.    Discussed need for stress/anxiety reducing techniques such as prayer/meditation/breathing and counting exercises and avoidance of stress producing environments/situations; will follow clinically.    Surveillance labs ordered today.    Continue PPI therapy.  Anti - reflux measures, trigger foods and drinks to avoid: Fatty foods, alcohol, chocolate, coffee, tea, caffeinated soft drinks (decaffeinated coffee still has some caffeine), peppermint and spearmint, spices and vinegar, citrus fruits and juices, tomatoes and tomato sauces, and smoking. Other antireflux measures include weight reduction if overweight, avoid tight clothing around the abdomen, elevate the head of her bed 6 inches (May use a bed wedge which is placed between the mattress in box [place]) or blocks under the head of the bed, don't drink or eat for 2 hours before going to bed and avoid lying down immediately after meals.      Discussion Summary:    Discussed plan of care in detail with pt today; pt verb understanding and agrees.    I spent 35 minutes caring for Carl on this date of service. This time includes time spent by me in the following activities:preparing for the visit, performing a medically appropriate examination and/or evaluation , counseling and educating the patient/family/caregiver, ordering medications, tests, or procedures, documenting information in the medical record, and care coordination    I have reviewed and updated all copied forward information, as appropriate.  I attest to the accuracy and relevance of any unchanged information.    Follow up:  No follow-ups on file.     Patient Instructions   Preventive Care  40-64 Years Old, Male  Preventive care refers to lifestyle choices and visits with your health care provider that can promote health and wellness. Preventive care visits are also called wellness exams.  What can I expect for my preventive care visit?  Counseling  During your preventive care visit, your health care provider may ask about your:  Medical history, including:  Past medical problems.  Family medical history.  Current health, including:  Emotional well-being.  Home life and relationship well-being.  Sexual activity.  Lifestyle, including:  Alcohol, nicotine or tobacco, and drug use.  Access to firearms.  Diet, exercise, and sleep habits.  Safety issues such as seatbelt and bike helmet use.  Sunscreen use.  Work and work environment.  Physical exam  Your health care provider will check your:  Height and weight. These may be used to calculate your BMI (body mass index). BMI is a measurement that tells if you are at a healthy weight.  Waist circumference. This measures the distance around your waistline. This measurement also tells if you are at a healthy weight and may help predict your risk of certain diseases, such as type 2 diabetes and high blood pressure.  Heart rate and blood pressure.  Body temperature.  Skin for abnormal spots.  What immunizations do I need?    Vaccines are usually given at various ages, according to a schedule. Your health care provider will recommend vaccines for you based on your age, medical history, and lifestyle or other factors, such as travel or where you work.  What tests do I need?  Screening  Your health care provider may recommend screening tests for certain conditions. This may include:  Lipid and cholesterol levels.  Diabetes screening. This is done by checking your blood sugar (glucose) after you have not eaten for a while (fasting).  Hepatitis B test.  Hepatitis C test.  HIV (human immunodeficiency virus) test.  STI (sexually transmitted infection) testing, if you are  at risk.  Lung cancer screening.  Prostate cancer screening.  Colorectal cancer screening.  Talk with your health care provider about your test results, treatment options, and if necessary, the need for more tests.  Follow these instructions at home:  Eating and drinking    Eat a diet that includes fresh fruits and vegetables, whole grains, lean protein, and low-fat dairy products.  Take vitamin and mineral supplements as recommended by your health care provider.  Do not drink alcohol if your health care provider tells you not to drink.  If you drink alcohol:  Limit how much you have to 0-2 drinks a day.  Know how much alcohol is in your drink. In the [place] one drink equals one 12 oz bottle of beer (355 mL), one 5 oz glass of wine (148 mL), or one 1½ oz glass of hard liquor (44 mL).  Lifestyle  Brush your teeth every morning and night with fluoride toothpaste. Floss one time each day.  Exercise for at least 30 minutes 5 or more days each week.  Do not use any products that contain nicotine or tobacco. These products include cigarettes, chewing tobacco, and vaping devices, such as e-cigarettes. If you need help quitting, ask your health care provider.  Do not use drugs.  If you are sexually active, practice safe sex. Use a condom or other form of protection to prevent STIs.  Take aspirin only as told by your health care provider. Make sure that you understand how much to take and what form to take. Work with your health care provider to find out whether it is safe and beneficial for you to take aspirin daily.  Find healthy ways to manage stress, such as:  Meditation, yoga, or listening to music.  Journaling.  Talking to a trusted person.  Spending time with friends and family.  Minimize exposure to UV radiation to reduce your risk of skin cancer.  Safety  Always wear your seat belt while driving or riding in a vehicle.  Do not drive:  If you have been drinking alcohol. Do not ride with someone who has been  drinking.  When you are tired or distracted.  While texting.  If you have been using any mind-altering substances or drugs.  Wear a helmet and other protective equipment during sports activities.  If you have firearms in your house, make sure you follow all gun safety procedures.  What's next?  Go to your health care provider once a year for an annual wellness visit.  Ask your health care provider how often you should have your eyes and teeth checked.  Stay up to date on all vaccines.  This information is not intended to replace advice given to you by your health care provider. Make sure you discuss any questions you have with your health care provider.        Nicola Perry,   05/24/24  10:13 EDT

## 2024-05-25 LAB
ALBUMIN SERPL-MCNC: 4.2 G/DL (ref 3.5–5.2)
ALBUMIN/GLOB SERPL: 2 G/DL
ALP SERPL-CCNC: 64 U/L (ref 39–117)
ALT SERPL-CCNC: 15 U/L (ref 1–41)
AST SERPL-CCNC: 16 U/L (ref 1–40)
BASOPHILS # BLD AUTO: 0.02 10*3/MM3 (ref 0–0.2)
BASOPHILS NFR BLD AUTO: 0.8 % (ref 0–1.5)
BILIRUB SERPL-MCNC: 0.6 MG/DL (ref 0–1.2)
BUN SERPL-MCNC: 16 MG/DL (ref 8–23)
BUN/CREAT SERPL: 13.4 (ref 7–25)
CALCIUM SERPL-MCNC: 9.1 MG/DL (ref 8.6–10.5)
CHLORIDE SERPL-SCNC: 105 MMOL/L (ref 98–107)
CHOLEST SERPL-MCNC: 183 MG/DL (ref 0–200)
CO2 SERPL-SCNC: 25.9 MMOL/L (ref 22–29)
CREAT SERPL-MCNC: 1.19 MG/DL (ref 0.76–1.27)
EGFRCR SERPLBLD CKD-EPI 2021: 69.9 ML/MIN/1.73
EOSINOPHIL # BLD AUTO: 0.09 10*3/MM3 (ref 0–0.4)
EOSINOPHIL NFR BLD AUTO: 3.5 % (ref 0.3–6.2)
ERYTHROCYTE [DISTWIDTH] IN BLOOD BY AUTOMATED COUNT: 12.9 % (ref 12.3–15.4)
GLOBULIN SER CALC-MCNC: 2.1 GM/DL
GLUCOSE SERPL-MCNC: 96 MG/DL (ref 65–99)
HCT VFR BLD AUTO: 46.2 % (ref 37.5–51)
HDLC SERPL-MCNC: 42 MG/DL (ref 40–60)
HGB BLD-MCNC: 15.6 G/DL (ref 13–17.7)
IMM GRANULOCYTES # BLD AUTO: 0 10*3/MM3 (ref 0–0.05)
IMM GRANULOCYTES NFR BLD AUTO: 0 % (ref 0–0.5)
LDLC SERPL CALC-MCNC: 119 MG/DL (ref 0–100)
LYMPHOCYTES # BLD AUTO: 1 10*3/MM3 (ref 0.7–3.1)
LYMPHOCYTES NFR BLD AUTO: 39.4 % (ref 19.6–45.3)
MCH RBC QN AUTO: 30.4 PG (ref 26.6–33)
MCHC RBC AUTO-ENTMCNC: 33.8 G/DL (ref 31.5–35.7)
MCV RBC AUTO: 89.9 FL (ref 79–97)
MONOCYTES # BLD AUTO: 0.25 10*3/MM3 (ref 0.1–0.9)
MONOCYTES NFR BLD AUTO: 9.8 % (ref 5–12)
NEUTROPHILS # BLD AUTO: 1.18 10*3/MM3 (ref 1.7–7)
NEUTROPHILS NFR BLD AUTO: 46.5 % (ref 42.7–76)
NRBC BLD AUTO-RTO: 0 /100 WBC (ref 0–0.2)
PLATELET # BLD AUTO: 163 10*3/MM3 (ref 140–450)
POTASSIUM SERPL-SCNC: 4.5 MMOL/L (ref 3.5–5.2)
PROT SERPL-MCNC: 6.3 G/DL (ref 6–8.5)
PSA SERPL-MCNC: 0.66 NG/ML (ref 0–4)
RBC # BLD AUTO: 5.14 10*6/MM3 (ref 4.14–5.8)
SODIUM SERPL-SCNC: 141 MMOL/L (ref 136–145)
TRIGL SERPL-MCNC: 120 MG/DL (ref 0–150)
VLDLC SERPL CALC-MCNC: 22 MG/DL (ref 5–40)
WBC # BLD AUTO: 2.54 10*3/MM3 (ref 3.4–10.8)

## 2025-04-28 ENCOUNTER — TELEPHONE (OUTPATIENT)
Age: 60
End: 2025-04-28
Payer: COMMERCIAL

## 2025-04-28 NOTE — TELEPHONE ENCOUNTER
Caller: COREY TRIANA    Relationship: Emergency Contact    Best call back number:   Telephone Information:   Mobile [phone]     What medication are you requesting: ECONAZOLE 2% CREAM    What are your current symptoms: NAIL FUNGUS    How long have you been experiencing symptoms: 6 MONTHS    Have you had these symptoms before:    [x] Yes  [] No    Have you been treated for these symptoms before:   [x] Yes  [] No    If a prescription is needed, what is your preferred pharmacy and phone number: [facility] [id number] [place], [place] - [address] AT [facility] [phone] [facility] [phone] FX     Additional notes:

## 2025-04-29 RX ORDER — ECONAZOLE NITRATE 10 MG/G
1 CREAM TOPICAL DAILY
Qty: 90 G | Refills: 1 | Status: SHIPPED | OUTPATIENT
Start: 2025-04-29 | End: 2025-04-29 | Stop reason: SDUPTHER

## 2025-04-29 RX ORDER — ECONAZOLE NITRATE 10 MG/G
1 CREAM TOPICAL DAILY
Qty: 90 G | Refills: 1 | Status: SHIPPED | OUTPATIENT
Start: 2025-04-29

## 2025-05-27 DIAGNOSIS — M25.561 ARTHRALGIA OF BOTH KNEES: ICD-10-CM

## 2025-05-27 DIAGNOSIS — M25.562 ARTHRALGIA OF BOTH KNEES: ICD-10-CM

## 2025-05-28 RX ORDER — MELOXICAM 7.5 MG/1
7.5 TABLET ORAL DAILY
Qty: 90 TABLET | Refills: 3 | Status: SHIPPED | OUTPATIENT
Start: 2025-05-28

## 2025-06-18 DIAGNOSIS — F41.1 GENERALIZED ANXIETY DISORDER: ICD-10-CM

## 2025-06-18 DIAGNOSIS — M25.561 ARTHRALGIA OF BOTH KNEES: ICD-10-CM

## 2025-06-18 DIAGNOSIS — M25.562 ARTHRALGIA OF BOTH KNEES: ICD-10-CM

## 2025-06-18 RX ORDER — PANTOPRAZOLE SODIUM 40 MG/1
40 TABLET, DELAYED RELEASE ORAL DAILY
Qty: 30 TABLET | Refills: 0 | Status: SHIPPED | OUTPATIENT
Start: 2025-06-18

## 2025-06-18 RX ORDER — ESCITALOPRAM OXALATE 10 MG/1
10 TABLET ORAL DAILY
Qty: 30 TABLET | Refills: 0 | Status: SHIPPED | OUTPATIENT
Start: 2025-06-18

## 2025-06-18 NOTE — TELEPHONE ENCOUNTER
Caller: [facility] [place] [address] - [phone] [facility] [phone] FX    Relationship: Pharmacy    Best call back number: [phone]     Requested Prescriptions:   Requested Prescriptions     Pending Prescriptions Disp Refills    pantoprazole (Protonix) 40 MG EC tablet 90 tablet 3     Sig: Take 1 tablet by mouth Daily.    escitalopram (LEXAPRO) 10 MG tablet 90 tablet 3     Sig: Take 1 tablet by mouth Daily.        Pharmacy where request should be sent: [facility] - [place] [address] - [phone] [facility] [phone] FX     Last office visit with prescribing clinician: 5/24/2024   Last telemedicine visit with prescribing clinician: Visit date not found   Next office visit with prescribing clinician: Visit date not found       Does the patient have less than a 3 day supply:  [x] Yes  [] No    Would you like a call back once the refill request has been completed: [] Yes [x] No    If the office needs to give you a call back, can they leave a voicemail: [] Yes [x] No    Fidel Diamond Rep   06/18/25 10:45 EDT

## 2025-06-24 DIAGNOSIS — M25.561 ARTHRALGIA OF BOTH KNEES: ICD-10-CM

## 2025-06-24 DIAGNOSIS — M25.562 ARTHRALGIA OF BOTH KNEES: ICD-10-CM

## 2025-06-24 DIAGNOSIS — F41.1 GENERALIZED ANXIETY DISORDER: ICD-10-CM

## 2025-06-24 RX ORDER — MELOXICAM 7.5 MG/1
7.5 TABLET ORAL DAILY
Qty: 90 TABLET | Refills: 3 | Status: SHIPPED | OUTPATIENT
Start: 2025-06-24

## 2025-06-24 RX ORDER — PANTOPRAZOLE SODIUM 40 MG/1
40 TABLET, DELAYED RELEASE ORAL DAILY
Qty: 90 TABLET | Refills: 3 | Status: SHIPPED | OUTPATIENT
Start: 2025-06-24

## 2025-07-03 DIAGNOSIS — M25.562 ARTHRALGIA OF BOTH KNEES: ICD-10-CM

## 2025-07-03 DIAGNOSIS — F41.1 GENERALIZED ANXIETY DISORDER: ICD-10-CM

## 2025-07-03 DIAGNOSIS — M25.561 ARTHRALGIA OF BOTH KNEES: ICD-10-CM

## 2025-07-03 RX ORDER — ESCITALOPRAM OXALATE 10 MG/1
10 TABLET ORAL DAILY
Qty: 30 TABLET | Refills: 0 | Status: SHIPPED | OUTPATIENT
Start: 2025-07-03

## 2025-07-03 NOTE — TELEPHONE ENCOUNTER
Rx Refill Note  Requested Prescriptions     Pending Prescriptions Disp Refills    escitalopram (LEXAPRO) 10 MG tablet 30 tablet 0     Sig: Take 1 tablet by mouth Daily.      Last office visit with prescribing clinician: 5/24/2024   Last telemedicine visit with prescribing clinician: Visit date not found   Next office visit with prescribing clinician: Visit date not found                         Would you like a call back once the refill request has been completed: [] Yes [] No    If the office needs to give you a call back, can they leave a voicemail: [] Yes [] No    Ira Marino MA  07/03/25, 16:05 EDT

## (undated) DEVICE — NITINOL WIRE WITH HYDROPHILIC TIP: Brand: SENSOR

## (undated) DEVICE — SUT SILK 2/0 FS BLK 18IN 685G

## (undated) DEVICE — GLV SURG SENSICARE LT W/ALOE PF LF 7 STRL

## (undated) DEVICE — URETERAL ACCESS SHEATH SET: Brand: NAVIGATOR HD

## (undated) DEVICE — GW AMPLTZ SUPRSTF PTFE JB .035 7X145CM

## (undated) DEVICE — ADAPT UROLOK

## (undated) DEVICE — ST FLD IRR WARM

## (undated) DEVICE — RICH CYSTO: Brand: MEDLINE INDUSTRIES, INC.

## (undated) DEVICE — HYBRID CO2 TUBING/CAP SET FOR OLYMPUS® SCOPES & CO2 SOURCE: Brand: ERBE

## (undated) DEVICE — SOL IRR NACL 0.9PCT 3000ML

## (undated) DEVICE — CATH URETRL AP 18F

## (undated) DEVICE — FIBR LASR HOLMIUM SLIMLINE 200 DFL 550U SMOTH TP 1P/U

## (undated) DEVICE — Device

## (undated) DEVICE — CUFF SCD HEMOFORCE SEQ CALF STD MD

## (undated) DEVICE — DUAL LUMEN URETERAL CATHETER

## (undated) DEVICE — ENDOSCOPY PORT CONNECTOR FOR OLYMPUS® SCOPES: Brand: ERBE

## (undated) DEVICE — VLV SXN AIR/H2O ORCAPOD3 1P/U STRL